# Patient Record
Sex: MALE | Race: WHITE | NOT HISPANIC OR LATINO | Employment: FULL TIME | URBAN - METROPOLITAN AREA
[De-identification: names, ages, dates, MRNs, and addresses within clinical notes are randomized per-mention and may not be internally consistent; named-entity substitution may affect disease eponyms.]

---

## 2023-07-19 ENCOUNTER — APPOINTMENT (OUTPATIENT)
Dept: GENERAL RADIOLOGY | Facility: CLINIC | Age: 18
End: 2023-07-19
Attending: EMERGENCY MEDICINE
Payer: COMMERCIAL

## 2023-07-19 ENCOUNTER — HOSPITAL ENCOUNTER (EMERGENCY)
Facility: CLINIC | Age: 18
Discharge: HOME | End: 2023-07-19
Attending: EMERGENCY MEDICINE | Admitting: EMERGENCY MEDICINE
Payer: COMMERCIAL

## 2023-07-19 VITALS
RESPIRATION RATE: 18 BRPM | OXYGEN SATURATION: 100 % | SYSTOLIC BLOOD PRESSURE: 135 MMHG | TEMPERATURE: 97.4 F | DIASTOLIC BLOOD PRESSURE: 55 MMHG | HEART RATE: 82 BPM

## 2023-07-19 DIAGNOSIS — D64.9 SYMPTOMATIC ANEMIA: ICD-10-CM

## 2023-07-19 LAB
ALBUMIN SERPL BCG-MCNC: 3.8 G/DL (ref 3.5–5.2)
ALP SERPL-CCNC: 101 U/L (ref 40–129)
ALT SERPL W P-5'-P-CCNC: 21 U/L (ref 0–70)
ANION GAP SERPL CALCULATED.3IONS-SCNC: 11 MMOL/L (ref 7–15)
AST SERPL W P-5'-P-CCNC: 28 U/L (ref 0–45)
BASOPHILS # BLD AUTO: 0 10E3/UL (ref 0–0.2)
BASOPHILS # BLD AUTO: 0 10E3/UL (ref 0–0.2)
BASOPHILS NFR BLD AUTO: 0 %
BASOPHILS NFR BLD AUTO: 0 %
BILIRUB DIRECT SERPL-MCNC: <0.2 MG/DL (ref 0–0.3)
BILIRUB SERPL-MCNC: <0.2 MG/DL
BUN SERPL-MCNC: 32.9 MG/DL (ref 6–20)
CALCIUM SERPL-MCNC: 9 MG/DL (ref 8.6–10)
CHLORIDE SERPL-SCNC: 101 MMOL/L (ref 98–107)
CREAT SERPL-MCNC: 1.15 MG/DL (ref 0.67–1.17)
DEPRECATED HCO3 PLAS-SCNC: 25 MMOL/L (ref 22–29)
EOSINOPHIL # BLD AUTO: 0.1 10E3/UL (ref 0–0.7)
EOSINOPHIL # BLD AUTO: 0.1 10E3/UL (ref 0–0.7)
EOSINOPHIL NFR BLD AUTO: 1 %
EOSINOPHIL NFR BLD AUTO: 1 %
ERYTHROCYTE [DISTWIDTH] IN BLOOD BY AUTOMATED COUNT: 12 % (ref 10–15)
ERYTHROCYTE [DISTWIDTH] IN BLOOD BY AUTOMATED COUNT: 12.1 % (ref 10–15)
FLUAV RNA SPEC QL NAA+PROBE: NEGATIVE
FLUBV RNA RESP QL NAA+PROBE: NEGATIVE
GFR SERPL CREATININE-BSD FRML MDRD: >90 ML/MIN/1.73M2
GLUCOSE SERPL-MCNC: 100 MG/DL (ref 70–99)
HCT VFR BLD AUTO: 22.1 % (ref 40–53)
HCT VFR BLD AUTO: 24 % (ref 40–53)
HGB BLD-MCNC: 7.6 G/DL (ref 13.3–17.7)
HGB BLD-MCNC: 8.3 G/DL (ref 13.3–17.7)
IMM GRANULOCYTES # BLD: 0 10E3/UL
IMM GRANULOCYTES # BLD: 0.1 10E3/UL
IMM GRANULOCYTES NFR BLD: 0 %
IMM GRANULOCYTES NFR BLD: 1 %
IRON BINDING CAPACITY (ROCHE): 298 UG/DL (ref 240–430)
IRON SATN MFR SERPL: 39 % (ref 15–46)
IRON SERPL-MCNC: 117 UG/DL (ref 61–157)
LDH SERPL L TO P-CCNC: 195 U/L (ref 0–250)
LYMPHOCYTES # BLD AUTO: 3 10E3/UL (ref 0.8–5.3)
LYMPHOCYTES # BLD AUTO: 3.3 10E3/UL (ref 0.8–5.3)
LYMPHOCYTES NFR BLD AUTO: 33 %
LYMPHOCYTES NFR BLD AUTO: 36 %
MCH RBC QN AUTO: 29.9 PG (ref 26.5–33)
MCH RBC QN AUTO: 30.1 PG (ref 26.5–33)
MCHC RBC AUTO-ENTMCNC: 34.4 G/DL (ref 31.5–36.5)
MCHC RBC AUTO-ENTMCNC: 34.6 G/DL (ref 31.5–36.5)
MCV RBC AUTO: 87 FL (ref 78–100)
MCV RBC AUTO: 87 FL (ref 78–100)
MONOCYTES # BLD AUTO: 0.5 10E3/UL (ref 0–1.3)
MONOCYTES # BLD AUTO: 0.6 10E3/UL (ref 0–1.3)
MONOCYTES NFR BLD AUTO: 6 %
MONOCYTES NFR BLD AUTO: 6 %
NEUTROPHILS # BLD AUTO: 4.6 10E3/UL (ref 1.6–8.3)
NEUTROPHILS # BLD AUTO: 5.8 10E3/UL (ref 1.6–8.3)
NEUTROPHILS NFR BLD AUTO: 56 %
NEUTROPHILS NFR BLD AUTO: 60 %
NRBC # BLD AUTO: 0 10E3/UL
NRBC # BLD AUTO: 0 10E3/UL
NRBC BLD AUTO-RTO: 0 /100
NRBC BLD AUTO-RTO: 0 /100
PLATELET # BLD AUTO: 225 10E3/UL (ref 150–450)
PLATELET # BLD AUTO: 259 10E3/UL (ref 150–450)
POTASSIUM SERPL-SCNC: 3.8 MMOL/L (ref 3.4–5.3)
PROT SERPL-MCNC: 6.3 G/DL (ref 6.4–8.3)
RBC # BLD AUTO: 2.54 10E6/UL (ref 4.4–5.9)
RBC # BLD AUTO: 2.76 10E6/UL (ref 4.4–5.9)
RETICS # AUTO: 0.05 10E6/UL (ref 0.03–0.1)
RETICS # AUTO: 0.06 10E6/UL (ref 0.03–0.1)
RETICS/RBC NFR AUTO: 1.8 % (ref 0.5–2)
RETICS/RBC NFR AUTO: 2 % (ref 0.5–2)
RSV RNA SPEC NAA+PROBE: NEGATIVE
SARS-COV-2 RNA RESP QL NAA+PROBE: NEGATIVE
SODIUM SERPL-SCNC: 137 MMOL/L (ref 136–145)
T4 FREE SERPL-MCNC: 1.26 NG/DL (ref 0.9–1.7)
TSH SERPL DL<=0.005 MIU/L-ACNC: 4.73 UIU/ML (ref 0.3–4.2)
WBC # BLD AUTO: 8.4 10E3/UL (ref 4–11)
WBC # BLD AUTO: 9.8 10E3/UL (ref 4–11)

## 2023-07-19 PROCEDURE — 83550 IRON BINDING TEST: CPT | Performed by: EMERGENCY MEDICINE

## 2023-07-19 PROCEDURE — 82728 ASSAY OF FERRITIN: CPT | Performed by: EMERGENCY MEDICINE

## 2023-07-19 PROCEDURE — 84466 ASSAY OF TRANSFERRIN: CPT | Performed by: EMERGENCY MEDICINE

## 2023-07-19 PROCEDURE — 84443 ASSAY THYROID STIM HORMONE: CPT | Performed by: EMERGENCY MEDICINE

## 2023-07-19 PROCEDURE — 36415 COLL VENOUS BLD VENIPUNCTURE: CPT | Performed by: EMERGENCY MEDICINE

## 2023-07-19 PROCEDURE — 71046 X-RAY EXAM CHEST 2 VIEWS: CPT

## 2023-07-19 PROCEDURE — 93005 ELECTROCARDIOGRAM TRACING: CPT

## 2023-07-19 PROCEDURE — 85060 BLOOD SMEAR INTERPRETATION: CPT | Performed by: PATHOLOGY

## 2023-07-19 PROCEDURE — 82248 BILIRUBIN DIRECT: CPT | Performed by: EMERGENCY MEDICINE

## 2023-07-19 PROCEDURE — 85025 COMPLETE CBC W/AUTO DIFF WBC: CPT | Performed by: EMERGENCY MEDICINE

## 2023-07-19 PROCEDURE — 84439 ASSAY OF FREE THYROXINE: CPT | Performed by: EMERGENCY MEDICINE

## 2023-07-19 PROCEDURE — 85045 AUTOMATED RETICULOCYTE COUNT: CPT | Performed by: EMERGENCY MEDICINE

## 2023-07-19 PROCEDURE — 87637 SARSCOV2&INF A&B&RSV AMP PRB: CPT | Performed by: EMERGENCY MEDICINE

## 2023-07-19 PROCEDURE — 99285 EMERGENCY DEPT VISIT HI MDM: CPT | Mod: 25

## 2023-07-19 PROCEDURE — 82746 ASSAY OF FOLIC ACID SERUM: CPT | Performed by: EMERGENCY MEDICINE

## 2023-07-19 PROCEDURE — 258N000003 HC RX IP 258 OP 636: Performed by: EMERGENCY MEDICINE

## 2023-07-19 PROCEDURE — 96360 HYDRATION IV INFUSION INIT: CPT

## 2023-07-19 PROCEDURE — 80053 COMPREHEN METABOLIC PANEL: CPT | Performed by: EMERGENCY MEDICINE

## 2023-07-19 PROCEDURE — 83615 LACTATE (LD) (LDH) ENZYME: CPT | Performed by: EMERGENCY MEDICINE

## 2023-07-19 PROCEDURE — 82607 VITAMIN B-12: CPT | Performed by: EMERGENCY MEDICINE

## 2023-07-19 RX ADMIN — SODIUM CHLORIDE 1000 ML: 9 INJECTION, SOLUTION INTRAVENOUS at 21:43

## 2023-07-19 ASSESSMENT — ACTIVITIES OF DAILY LIVING (ADL)
ADLS_ACUITY_SCORE: 35
ADLS_ACUITY_SCORE: 35

## 2023-07-19 NOTE — LETTER
July 19, 2023      To Whom It May Concern:      Brian Hooks was seen in our Emergency Department today, 07/19/23. He will require modified/light duty until cleared by his primary care physician. He should avoid working at heights or in any situation where an unexpected fall or fainting episode could cause injury.      Sincerely,        Wally Small MD

## 2023-07-20 ENCOUNTER — PATIENT OUTREACH (OUTPATIENT)
Dept: ONCOLOGY | Facility: CLINIC | Age: 18
End: 2023-07-20
Payer: COMMERCIAL

## 2023-07-20 ENCOUNTER — HOSPITAL ENCOUNTER (INPATIENT)
Facility: CLINIC | Age: 18
LOS: 1 days | Discharge: HOME | DRG: 378 | End: 2023-07-21
Attending: EMERGENCY MEDICINE | Admitting: INTERNAL MEDICINE
Payer: COMMERCIAL

## 2023-07-20 ENCOUNTER — NURSE TRIAGE (OUTPATIENT)
Dept: NURSING | Facility: CLINIC | Age: 18
End: 2023-07-20

## 2023-07-20 ENCOUNTER — TELEPHONE (OUTPATIENT)
Dept: ONCOLOGY | Facility: CLINIC | Age: 18
End: 2023-07-20

## 2023-07-20 DIAGNOSIS — F19.90 EXCESSIVE USE OF NONSTEROIDAL ANTI-INFLAMMATORY DRUGS (NSAIDS): ICD-10-CM

## 2023-07-20 DIAGNOSIS — D64.9 SYMPTOMATIC ANEMIA: ICD-10-CM

## 2023-07-20 DIAGNOSIS — K92.2 UPPER GI BLEED: ICD-10-CM

## 2023-07-20 LAB
ABO/RH(D): NORMAL
ALBUMIN SERPL BCG-MCNC: 3.9 G/DL (ref 3.5–5.2)
ALBUMIN SERPL BCG-MCNC: 4 G/DL (ref 3.5–5.2)
ALP SERPL-CCNC: 101 U/L (ref 40–129)
ALP SERPL-CCNC: 102 U/L (ref 40–129)
ALT SERPL W P-5'-P-CCNC: 23 U/L (ref 0–70)
ALT SERPL W P-5'-P-CCNC: 23 U/L (ref 0–70)
ANION GAP SERPL CALCULATED.3IONS-SCNC: 9 MMOL/L (ref 7–15)
ANTIBODY SCREEN: NEGATIVE
AST SERPL W P-5'-P-CCNC: 27 U/L (ref 0–45)
AST SERPL W P-5'-P-CCNC: 28 U/L (ref 0–45)
ATRIAL RATE - MUSE: 86 BPM
BASOPHILS # BLD AUTO: 0 10E3/UL (ref 0–0.2)
BASOPHILS NFR BLD AUTO: 0 %
BILIRUB DIRECT SERPL-MCNC: <0.2 MG/DL (ref 0–0.3)
BILIRUB SERPL-MCNC: 0.2 MG/DL
BILIRUB SERPL-MCNC: 0.2 MG/DL
BLD PROD TYP BPU: NORMAL
BLOOD COMPONENT TYPE: NORMAL
BUN SERPL-MCNC: 21 MG/DL (ref 6–20)
CALCIUM SERPL-MCNC: 9 MG/DL (ref 8.6–10)
CHLORIDE SERPL-SCNC: 103 MMOL/L (ref 98–107)
CODING SYSTEM: NORMAL
CREAT SERPL-MCNC: 1.14 MG/DL (ref 0.67–1.17)
CROSSMATCH: NORMAL
DEPRECATED HCO3 PLAS-SCNC: 26 MMOL/L (ref 22–29)
DIASTOLIC BLOOD PRESSURE - MUSE: NORMAL MMHG
EOSINOPHIL # BLD AUTO: 0.2 10E3/UL (ref 0–0.7)
EOSINOPHIL NFR BLD AUTO: 2 %
ERYTHROCYTE [DISTWIDTH] IN BLOOD BY AUTOMATED COUNT: 12.3 % (ref 10–15)
FERRITIN SERPL-MCNC: 78 NG/ML (ref 31–409)
FOLATE SERPL-MCNC: 13.6 NG/ML (ref 4.6–34.8)
GFR SERPL CREATININE-BSD FRML MDRD: >90 ML/MIN/1.73M2
GLUCOSE SERPL-MCNC: 103 MG/DL (ref 70–99)
HCT VFR BLD AUTO: 24.6 % (ref 40–53)
HGB BLD-MCNC: 8.3 G/DL (ref 13.3–17.7)
HOLD SPECIMEN: NORMAL
IMM GRANULOCYTES # BLD: 0.1 10E3/UL
IMM GRANULOCYTES NFR BLD: 1 %
INR PPP: 1 (ref 0.85–1.15)
INTERPRETATION ECG - MUSE: NORMAL
ISSUE DATE AND TIME: NORMAL
LYMPHOCYTES # BLD AUTO: 2.3 10E3/UL (ref 0.8–5.3)
LYMPHOCYTES NFR BLD AUTO: 35 %
MCH RBC QN AUTO: 29.6 PG (ref 26.5–33)
MCHC RBC AUTO-ENTMCNC: 33.7 G/DL (ref 31.5–36.5)
MCV RBC AUTO: 88 FL (ref 78–100)
MONOCYTES # BLD AUTO: 0.4 10E3/UL (ref 0–1.3)
MONOCYTES NFR BLD AUTO: 7 %
NEUTROPHILS # BLD AUTO: 3.6 10E3/UL (ref 1.6–8.3)
NEUTROPHILS NFR BLD AUTO: 55 %
NRBC # BLD AUTO: 0 10E3/UL
NRBC BLD AUTO-RTO: 0 /100
P AXIS - MUSE: 65 DEGREES
PATH REPORT.COMMENTS IMP SPEC: NORMAL
PATH REPORT.FINAL DX SPEC: NORMAL
PATH REPORT.MICROSCOPIC SPEC OTHER STN: NORMAL
PATH REPORT.MICROSCOPIC SPEC OTHER STN: NORMAL
PATH REPORT.RELEVANT HX SPEC: NORMAL
PLATELET # BLD AUTO: 267 10E3/UL (ref 150–450)
POTASSIUM SERPL-SCNC: 4.1 MMOL/L (ref 3.4–5.3)
PR INTERVAL - MUSE: 170 MS
PROT SERPL-MCNC: 6.2 G/DL (ref 6.4–8.3)
PROT SERPL-MCNC: 6.3 G/DL (ref 6.4–8.3)
QRS DURATION - MUSE: 94 MS
QT - MUSE: 370 MS
QTC - MUSE: 442 MS
R AXIS - MUSE: 80 DEGREES
RBC # BLD AUTO: 2.8 10E6/UL (ref 4.4–5.9)
SODIUM SERPL-SCNC: 138 MMOL/L (ref 136–145)
SPECIMEN EXPIRATION DATE: NORMAL
SYSTOLIC BLOOD PRESSURE - MUSE: NORMAL MMHG
T AXIS - MUSE: 36 DEGREES
TRANSFERRIN SERPL-MCNC: 233 MG/DL (ref 200–360)
UNIT ABO/RH: NORMAL
UNIT NUMBER: NORMAL
UNIT STATUS: NORMAL
UNIT TYPE ISBT: 5100
VENTRICULAR RATE- MUSE: 86 BPM
VIT B12 SERPL-MCNC: 453 PG/ML (ref 232–1245)
WBC # BLD AUTO: 6.5 10E3/UL (ref 4–11)

## 2023-07-20 PROCEDURE — 99223 1ST HOSP IP/OBS HIGH 75: CPT | Performed by: INTERNAL MEDICINE

## 2023-07-20 PROCEDURE — 120N000001 HC R&B MED SURG/OB

## 2023-07-20 PROCEDURE — 82248 BILIRUBIN DIRECT: CPT | Performed by: EMERGENCY MEDICINE

## 2023-07-20 PROCEDURE — 36415 COLL VENOUS BLD VENIPUNCTURE: CPT | Performed by: EMERGENCY MEDICINE

## 2023-07-20 PROCEDURE — 99285 EMERGENCY DEPT VISIT HI MDM: CPT | Mod: 25

## 2023-07-20 PROCEDURE — 86850 RBC ANTIBODY SCREEN: CPT | Performed by: EMERGENCY MEDICINE

## 2023-07-20 PROCEDURE — 85610 PROTHROMBIN TIME: CPT | Performed by: EMERGENCY MEDICINE

## 2023-07-20 PROCEDURE — C9113 INJ PANTOPRAZOLE SODIUM, VIA: HCPCS | Mod: JZ | Performed by: EMERGENCY MEDICINE

## 2023-07-20 PROCEDURE — 96374 THER/PROPH/DIAG INJ IV PUSH: CPT

## 2023-07-20 PROCEDURE — P9016 RBC LEUKOCYTES REDUCED: HCPCS | Performed by: EMERGENCY MEDICINE

## 2023-07-20 PROCEDURE — 85014 HEMATOCRIT: CPT | Performed by: EMERGENCY MEDICINE

## 2023-07-20 PROCEDURE — 250N000011 HC RX IP 250 OP 636: Mod: JZ | Performed by: INTERNAL MEDICINE

## 2023-07-20 PROCEDURE — 86901 BLOOD TYPING SEROLOGIC RH(D): CPT | Performed by: EMERGENCY MEDICINE

## 2023-07-20 PROCEDURE — 93005 ELECTROCARDIOGRAM TRACING: CPT

## 2023-07-20 PROCEDURE — 86923 COMPATIBILITY TEST ELECTRIC: CPT | Performed by: EMERGENCY MEDICINE

## 2023-07-20 PROCEDURE — 250N000011 HC RX IP 250 OP 636: Mod: JZ | Performed by: EMERGENCY MEDICINE

## 2023-07-20 PROCEDURE — C9113 INJ PANTOPRAZOLE SODIUM, VIA: HCPCS | Mod: JZ | Performed by: INTERNAL MEDICINE

## 2023-07-20 RX ADMIN — PANTOPRAZOLE SODIUM 40 MG: 40 INJECTION, POWDER, FOR SOLUTION INTRAVENOUS at 19:56

## 2023-07-20 RX ADMIN — PANTOPRAZOLE SODIUM 40 MG: 40 INJECTION, POWDER, FOR SOLUTION INTRAVENOUS at 22:51

## 2023-07-20 ASSESSMENT — ACTIVITIES OF DAILY LIVING (ADL)
ADLS_ACUITY_SCORE: 33
ADLS_ACUITY_SCORE: 35
ADLS_ACUITY_SCORE: 35
ADLS_ACUITY_SCORE: 33

## 2023-07-20 NOTE — TELEPHONE ENCOUNTER
Caller reporting the following red-flag symptom(s): Blood in stoop    Per the system red-flag symptom policy, patient was instructed to:  speak with a Registered Nurse    Action:  Patient warm transferred to a Registered Nurse     Was able to connect Pt's mom Neha with Sara red flag RN.

## 2023-07-20 NOTE — PROGRESS NOTES
Referral received for benign heme services, see below.    Referral reason: Anemia    Current abnormal labs: Available in Chart Review    Outreach: Generic voicemail left regarding referral.    Plan: Triage instructions updated and sent to NPS for completion, VM left for patient, [facility] not available to send message.

## 2023-07-20 NOTE — ED PROVIDER NOTES
History     Chief Complaint:  Dizziness and Shortness of Breath       The history is provided by the patient.      Brian Hooks is a 18 year old male who presents to the ED for evaluation of fatigue and dizziness since yesterday. 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 He endorses heavy breathing and feeling winded easy as well. Patient denies recent syncope. He has no shortness of breath at rest. Of note, the patient feels that it takes a lot of energy to walk recently. He adds that a few weeks ago he was doing his normal workout routine when he suddenly had to stop because he couldn't breathe. He has not had any issues with his workouts since this time. The patient also reports a constant headache recently. He notes having a cold involving a sore throat, runny nose, and loss of appetite over the last week as well. These symptoms have now improved. The patient denies recent fevers, chest pain, chest tightness, abdominal pain, vomiting, diarrhea, urinary symptoms, current rashes, black/bloody stools, and other sources of bleeding. He also denies muscle pain and joint issues. The patient does not take any prescription medications but uses Creatine, ashwagandha, and sea manning. The patient does not smoke or drink alcohol regularly. No family history of blood disorders.      Independent Historian:   None - Patient Only    Review of External Notes:  none    ROS:  See HPI    Allergies:  No Known Allergies     Physical Exam     Patient Vitals for the past 24 hrs:   BP Temp Temp src Pulse Resp SpO2   07/19/23 2322 135/55 -- -- 82 18 100 %   07/19/23 2145 124/65 -- -- -- -- 100 %   07/19/23 2016 (!) 158/78 97.4  F (36.3  C) Temporal 90 18 100 %        Physical Exam  General: Well appearing, nontoxic.  Resting comfortably  Head:  Scalp, face, and head appear normal  Eyes:  Pupils are equal,  round    Conjunctivae non-injected and sclerae white  ENT:    The external nose is normal    Pinnae are normal  Neck:  Normal range of motion    There is no rigidity noted    Trachea is in the midline  CV:  Regular rate and rhythm     Normal S1/S2, no S3/S4    No murmur or rub. Radial pulses 2+ bilaterally.  Resp:  Lungs are clear and equal bilaterally  There is no tachypnea    No increased work of breathing    No rales, wheezing, or rhonchi  GI:  Abdomen is soft, no rigidity or guarding    No distension, or mass    No tenderness or rebound tenderness   MS:  Normal muscular tone    Symmetric motor strength    No lower extremity edema  Skin:  No rash or acute skin lesions noted  Neuro: Awake and alert  Speech is normal and fluent  Moves all extremities spontaneously  Psych:  Normal affect. Appropriate interactions.      Emergency Department Course   ECG:  ECG results from 07/19/23   EKG 12-lead, tracing only     Value    Systolic Blood Pressure     Diastolic Blood Pressure     Ventricular Rate 86    Atrial Rate 86    UT Interval 170    QRS Duration 94        QTc 442    P Axis 65    R AXIS 80    T Axis 36    Interpretation ECG      Sinus rhythm with sinus arrhythmia  Normal ECG  No previous ECGs available         Imaging:  XR Chest 2 Views   Final Result   IMPRESSION: Heart is normal in size. Lungs are clear.         Report per radiology    Laboratory:  Labs Ordered and Resulted from Time of ED Arrival to Time of ED Departure   BASIC METABOLIC PANEL - Abnormal       Result Value    Sodium 137      Potassium 3.8      Chloride 101      Carbon Dioxide (CO2) 25      Anion Gap 11      Urea Nitrogen 32.9 (*)     Creatinine 1.15      Calcium 9.0      Glucose 100 (*)     GFR Estimate >90     CBC WITH PLATELETS AND DIFFERENTIAL - Abnormal    WBC Count 9.8      RBC Count 2.76 (*)     Hemoglobin 8.3 (*)     Hematocrit 24.0 (*)     MCV 87      MCH 30.1      MCHC 34.6      RDW 12.1      Platelet Count 259      % Neutrophils  60      % Lymphocytes 33      % Monocytes 6      % Eosinophils 1      % Basophils 0      % Immature Granulocytes 0      NRBCs per 100 WBC 0      Absolute Neutrophils 5.8      Absolute Lymphocytes 3.3      Absolute Monocytes 0.6      Absolute Eosinophils 0.1      Absolute Basophils 0.0      Absolute Immature Granulocytes 0.0      Absolute NRBCs 0.0     HEPATIC FUNCTION PANEL - Abnormal    Protein Total 6.3 (*)     Albumin 3.8      Bilirubin Total <0.2      Alkaline Phosphatase 101      AST 28      ALT 21      Bilirubin Direct <0.20     TSH WITH FREE T4 REFLEX - Abnormal    TSH 4.73 (*)    CBC WITH PLATELETS AND DIFFERENTIAL - Abnormal    WBC Count 8.4      RBC Count 2.54 (*)     Hemoglobin 7.6 (*)     Hematocrit 22.1 (*)     MCV 87      MCH 29.9      MCHC 34.4      RDW 12.0      Platelet Count 225      % Neutrophils 56      % Lymphocytes 36      % Monocytes 6      % Eosinophils 1      % Basophils 0      % Immature Granulocytes 1      NRBCs per 100 WBC 0      Absolute Neutrophils 4.6      Absolute Lymphocytes 3.0      Absolute Monocytes 0.5      Absolute Eosinophils 0.1      Absolute Basophils 0.0      Absolute Immature Granulocytes 0.1      Absolute NRBCs 0.0     INFLUENZA A/B, RSV, & SARS-COV2 PCR - Normal    Influenza A PCR Negative      Influenza B PCR Negative      RSV PCR Negative      SARS CoV2 PCR Negative     LACTATE DEHYDROGENASE - Normal    Lactate Dehydrogenase 195     RETICULOCYTE COUNT - Normal    % Reticulocyte 2.0      Absolute Reticulocyte 0.056     IRON AND IRON BINDING CAPACITY - Normal    Iron 117      Iron Binding Capacity 298      Iron Sat Index 39     RETICULOCYTE COUNT - Normal    % Reticulocyte 1.8      Absolute Reticulocyte 0.046     T4 FREE - Normal    Free T4 1.26     MORPHOLOGY TRACKING   FERRITIN   VITAMIN B12   FOLATE   TRANSFERRIN   BLOOD MORPHOLOGY PATHOLOGIST REVIEW   LAB BLOOD MORPHOLOGY PATHOLOGIST REVIEW        Procedures     Emergency Department Course &  Assessments:      Interventions:  Medications   0.9% sodium chloride BOLUS (0 mLs Intravenous Stopped 7/19/23 2243)        Assessments, Independent Interpretation, Consult/Discussion of Management and Tests:  ED Course as of 07/20/23 0036   Wed Jul 19, 2023 2135 I performed an independent interpretation of the patient's chest radiograph.  No pneumothorax or large pleural effusions.   2152 I obtained history and examined the patient as noted above.     Social Determinants of Health affecting care:  None    Disposition:  The patient was discharged to home.     Impression & Plan      Medical Decision Making:  Brian Hooks is a 18 year old male who presents to the ED for evaluation of fatigue, weakness, dizziness. On my evaluation the patient is well appearing, hemodynamically stable and afebrile. No focal neurologic deficits. ECG without ischemia or dysrhythmia. CXR neg. COVID/Influenza/RSV testing negative. Exam is otherwise benign. Lab studies reveal normocytic anemia of unclear etiology. CBC was repeated to confirm. Patient denies any BRBPR, melena, or any other bleeding. He has no abdominal pain. He is not anticoagulated or on antiplatelets. Lab studies not consistent with hemolysis. Work up for anemia was started with iron studies, B12, folate, retic count, peripheral smear. Patient is otherwise stable. Hgb > 7 therefore no emergent blood transfusion is indicated at this time. He will need very close outpatient follow up with PCP and hematology for follow up of the anemia labs and further testing and evaluation. Patient to return immediately for any worsening, or any bleeding. Patient and patient's mother agreeable with the plan of care. I discussed with him that he should not work at any heights or in situations where getting dizzy or fainting could cause him injury until his evaluation is completed and he is feeling improved. Return precautions were discussed with patient. The patient's questions were  answered and the patient was agreeable with discharge.       Diagnosis:    ICD-10-CM    1. Symptomatic anemia  D64.9 Primary Care Referral     Adult Oncology/Hematology  Referral           Scribe Disclosure:  I, Fanny Julia, am serving as a scribe at 8:55 PM on 7/19/2023 to document services personally performed by Wally Small MD based on my observations and the provider's statements to me.     Scribe Disclosure:  I, Nirmala Leticia, am serving as a scribe at 10:45 PM on 7/19/2023 to document services personally performed by Wally Small MD based on my observations and the provider's statements to me.     7/19/2023   Wally Small MD     Historical Data:  ______________________________________________________________________  Medications:    No current outpatient medications on file.      Past Medical History:   Past Surgical History:     No past medical history on file. No past surgical history on file.   There are no problems to display for this patient.         Family History:    family history is not on file. Social History:        PCP: No primary care provider on file.            Wally Small MD  07/20/23 0129

## 2023-07-20 NOTE — ED TRIAGE NOTES
Pt was here yesterday for lightheadedness x2 days and SOB. Hgb found to be low at 8.3 and 7.6 on recheck but pt denied any bloody stools at that time and was discharged home. Today pt had dark bloody stool x2 with continued lightheadedness and SOB. ABCs intact.       
Father  Still living? Unknown  Family history of coronary artery disease, Age at diagnosis: Age Unknown     Sibling  Still living? No  Family history of coronary artery disease, Age at diagnosis: Age Unknown

## 2023-07-20 NOTE — ED NOTES
Rapid Assessment Note    History:   Brian Hooks is a 18 year old male who presents with 2 days lightheadedness, dizziness, and shortness of breath. He was evaluated for this on the day of onset, and when asked about blood in stool, he denied this, but recalls that he never attention to his stool. Since coming home, he has now noticed dark bloody stools yesterday evening. He regularly takes ibuprofen and Aspirin due to regular headaches. He currently denies abdominal pain.    Exam:   General:  Alert, interactive  Cardiovascular:  Well perfused  Lungs:  No respiratory distress, no accessory muscle use  Neuro:  Moving all 4 extremities  Skin:  Warm, dry  Psych:  Normal affect    Plan of Care:   I evaluated the patient and developed an initial plan of care. I discussed this plan and explained that I, or one of my partners, would be returning to complete the evaluation.       Needs room for complete exam. No pain so no CT ordered.  Explained likely cause too many NSAIDS.   Stable bi bleeds can often be management with PPI, decrease NSAIDS, Follow-up with GI.     Scribe Disclosure:  I, Pardeep Ferrer, am serving as a scribe at 4:56 PM on 7/20/2023 to document services personally performed by Flex Casiano MD based on my observations and the provider's statements to me.     7/20/2023  [employer]    Portions of this medical record were completed by a scribe. UPON MY REVIEW AND AUTHENTICATION BY ELECTRONIC SIGNATURE, this confirms (a) I performed the applicable clinical services, and (b) the record is accurate.      Flex Casiano MD  07/20/23 8506

## 2023-07-20 NOTE — ED TRIAGE NOTES
Presents to triage with c/o dizziness and SOB that began yesterday. patient stated that when he changes positions he feels as though he might pass out. Had cold symptoms for about a week and a half prior to the start of these symptoms.

## 2023-07-20 NOTE — DISCHARGE INSTRUCTIONS
Your blood test (hemoglobin and blood counts) should be rechecked in 3-5 days to make sure they are not getting lower. Return to the ED immediately for any worsening of your condition.

## 2023-07-20 NOTE — ED NOTES
Rapid Assessment Note    History:   Brian Hooks is a 18 year old male who presents with fatigue and dizziness. Last 2 days, patient reports feeling increasingly dizzy when he stands up or changes position. He reports that when he kneels down at work and stands up, feels that he is going to pass out. No syncope. Endorses heavy breathing and is feeling winded easy. No shortness of breath at rest. Takes a lot of energy to walk. Really fatigued and tired. Endorses starting of a cold such as sore throat and runny nose over the last week. He notes that his mucous is green. Endorses Loss of appetite. Denies fever and chest pain. Denies vomiting, diarreha, urianry symptoms. Had a rash left arm last week but cleared up. Does not take any medication    Exam:   General:  Alert, interactive  Cardiovascular:  Well perfused  Lungs:  No respiratory distress, no accessory muscle use  Neuro:  Moving all 4 extremities  Skin:  Warm, dry  Psych:  Normal affect      Plan of Care:   I evaluated the patient and developed an initial plan of care. I discussed this plan and explained that I, or one of my partners, would be returning to complete the evaluation.     Labs, COVID, CXR      I, Fanny Dumont, am serving as a scribe to document services personally performed by Wally Small MD, based on my observations and the provider's statements to me.    7/19/2023  [employer]    Portions of this medical record were completed by a scribe. UPON MY REVIEW AND AUTHENTICATION BY ELECTRONIC SIGNATURE, this confirms (a) I performed the applicable clinical services, and (b) the record is accurate.        Wally Small MD  07/19/23 2032

## 2023-07-20 NOTE — TELEPHONE ENCOUNTER
Nurse Triage SBAR    Is this a 2nd Level Triage? YES, LICENSED PRACTITIONER REVIEW IS REQUIRED    Situation: Pt went to ED yesterday, low hgb  8.3.  See visit notes.  Stated when asked about bloody stools, he responded none, because he did not pay attention to his stools.    Now reporting that last night and today with formed stool, burgundy in color, no clots passed.   He does take Ibuprofen 250 mg  and ASA , 325mg for headaches almost daily.  Patient for 2 days prior was pale, lightheaded, dizzy when at work or getting up.  He installs solar panels.  Up and down,   That is what made him present to the ED.  Patient states that he is not so pale today, that is collaborated by his Mother as well, she is included in on the call.  Verbal consent to talk with Mother given by patient.  They are asked to make sure that they sign forms at the visit for FAY consent and all that.  Voiding ok  No change in diet.  No ingestion of food red in color.  No injury or fall.  Patient denies SOB or chest discomfort.  Abdomen is not tender or bloated or distended.    Background: see above, see ED notes      Protocol Recommended Disposition:   RN routing message to clinic    Recommendation:     Discussed with patient and his Mother.  When to return to ED.   Pain, bloating, increased blood, increased fatique, SOB or chest discomfort.  They verbalized understanding and agreement with plan.  RN to route to clinic for next steps.      Routed to provider        Reason for Disposition    Patient wants to be seen    Additional Information    Negative: Passed out (i.e., fainted, collapsed and was not responding)    Negative: Shock suspected (e.g., cold/pale/clammy skin, too weak to stand, low BP, rapid pulse)    Negative: Vomiting red blood or black (coffee ground) material    Negative: Sounds like a life-threatening emergency to the triager    Negative: SEVERE rectal bleeding (large blood clots; constant or on and off bleeding)    Negative:  "SEVERE dizziness (e.g., unable to stand, requires support to walk, feels like passing out now)    Negative: MODERATE rectal bleeding (small blood clots, passing blood without stool, or toilet water turns red) more than once a day    Negative: Bloody, black, or tarry bowel movements  (Exception: Chronic-unchanged black-grey bowel movements and is taking iron pills or Pepto-Bismol.)    Negative: High-risk adult (e.g., prior surgery on aorta, abdominal aortic aneurysm)    Negative: Rectal foreign body (inserted or swallowed)    Negative: SEVERE abdominal pain (e.g., excruciating)    Negative: Constant abdominal pain lasting > 2 hours    Negative: Pale skin (pallor) of new-onset or worsening    Negative: Patient sounds very sick or weak to the triager    Negative: MILD rectal bleeding (more than just a few drops or streaks)    Negative: Cancer of rectum or intestines (colon)    Negative: Radiation therapy to lower abdomen or pelvis    Negative: Known cirrhosis of the liver (or history of liver failure or ascites)    Negative: Colonoscopy in past 72 hours    Negative: Taking Coumadin (warfarin) or other strong blood thinner, or known bleeding disorder (e.g., thrombocytopenia)    Negative: Rectal bleeding is minimal (e.g., blood just on toilet paper, a few drops in toilet bowl)    Answer Assessment - Initial Assessment Questions  1. APPEARANCE of BLOOD: \"What color is it?\" \"Is it passed separately, on the surface of the stool, or mixed in with the stool?\"       Blood in bowel movement, burgandy in color, stool formed,, no clots noted  2. AMOUNT: \"How much blood was passed?\"       Stool is dark rachel danay, near stool the bowel was reddish.  No clots seen on tissue paper  3. FREQUENCY: \"How many times has blood been passed with the stools?\"       2 , he was not paying attention to it before  4. ONSET: \"When was the blood first seen in the stools?\" (Days or weeks)      Last night and today  5. DIARRHEA: \"Is there also some " "diarrhea?\" If Yes, ask: \"How many diarrhea stools in the past 24 hours?\"       no  6. CONSTIPATION: \"Do you have constipation?\" If Yes, ask: \"How bad is it?\"      Not constipated  7. RECURRENT SYMPTOMS: \"Have you had blood in your stools before?\" If Yes, ask: \"When was the last time?\" and \"What happened that time?\"       no  8. BLOOD THINNERS: \"Do you take any blood thinners?\" (e.g., Coumadin/warfarin, Pradaxa/dabigatran, aspirin)      No      But does take Ibuprofen,  Headache daily.  Ibuprofen, 200mg   taken daily is estimated at 2-3 a day and alternating Aspirin  325mg    9. OTHER SYMPTOMS: \"Do you have any other symptoms?\"  (e.g., abdomen pain, vomiting, dizziness, fever)      No abdominal pain, no vomiting and no bloating,  Yes, dizziness especially at work, up and down.  Install solar panels.  No fever or chills  10. PREGNANCY: \"Is there any chance you are pregnant?\" \"When was your last menstrual period?\"n/a    Protocols used: RECTAL BLEEDING-A-[place]      "

## 2023-07-21 VITALS
SYSTOLIC BLOOD PRESSURE: 114 MMHG | BODY MASS INDEX: 26.04 KG/M2 | WEIGHT: 186 LBS | RESPIRATION RATE: 16 BRPM | HEIGHT: 71 IN | TEMPERATURE: 97.5 F | DIASTOLIC BLOOD PRESSURE: 65 MMHG | HEART RATE: 61 BPM | OXYGEN SATURATION: 98 %

## 2023-07-21 LAB
ALBUMIN SERPL BCG-MCNC: 3.5 G/DL (ref 3.5–5.2)
ALP SERPL-CCNC: 100 U/L (ref 40–129)
ALT SERPL W P-5'-P-CCNC: 22 U/L (ref 0–70)
ANION GAP SERPL CALCULATED.3IONS-SCNC: 10 MMOL/L (ref 7–15)
AST SERPL W P-5'-P-CCNC: 24 U/L (ref 0–45)
ATRIAL RATE - MUSE: 73 BPM
BILIRUB DIRECT SERPL-MCNC: <0.2 MG/DL (ref 0–0.3)
BILIRUB SERPL-MCNC: 0.2 MG/DL
BUN SERPL-MCNC: 16.4 MG/DL (ref 6–20)
CALCIUM SERPL-MCNC: 8.8 MG/DL (ref 8.6–10)
CHLORIDE SERPL-SCNC: 105 MMOL/L (ref 98–107)
CREAT SERPL-MCNC: 1.17 MG/DL (ref 0.67–1.17)
DEPRECATED HCO3 PLAS-SCNC: 26 MMOL/L (ref 22–29)
DIASTOLIC BLOOD PRESSURE - MUSE: NORMAL MMHG
ERYTHROCYTE [DISTWIDTH] IN BLOOD BY AUTOMATED COUNT: 12.6 % (ref 10–15)
GFR SERPL CREATININE-BSD FRML MDRD: >90 ML/MIN/1.73M2
GLUCOSE SERPL-MCNC: 111 MG/DL (ref 70–99)
HCT VFR BLD AUTO: 25.7 % (ref 40–53)
HGB BLD-MCNC: 8.7 G/DL (ref 13.3–17.7)
HGB BLD-MCNC: 8.8 G/DL (ref 13.3–17.7)
HGB BLD-MCNC: 9.2 G/DL (ref 13.3–17.7)
INTERPRETATION ECG - MUSE: NORMAL
MCH RBC QN AUTO: 30 PG (ref 26.5–33)
MCHC RBC AUTO-ENTMCNC: 33.9 G/DL (ref 31.5–36.5)
MCV RBC AUTO: 89 FL (ref 78–100)
P AXIS - MUSE: 64 DEGREES
PLATELET # BLD AUTO: 223 10E3/UL (ref 150–450)
POTASSIUM SERPL-SCNC: 4.4 MMOL/L (ref 3.4–5.3)
PR INTERVAL - MUSE: 168 MS
PROT SERPL-MCNC: 5.7 G/DL (ref 6.4–8.3)
QRS DURATION - MUSE: 96 MS
QT - MUSE: 396 MS
QTC - MUSE: 436 MS
R AXIS - MUSE: 80 DEGREES
RBC # BLD AUTO: 2.9 10E6/UL (ref 4.4–5.9)
SODIUM SERPL-SCNC: 141 MMOL/L (ref 136–145)
SYSTOLIC BLOOD PRESSURE - MUSE: NORMAL MMHG
T AXIS - MUSE: 37 DEGREES
UPPER GI ENDOSCOPY: NORMAL
VENTRICULAR RATE- MUSE: 73 BPM
WBC # BLD AUTO: 5.5 10E3/UL (ref 4–11)

## 2023-07-21 PROCEDURE — 99238 HOSP IP/OBS DSCHRG MGMT 30/<: CPT | Performed by: STUDENT IN AN ORGANIZED HEALTH CARE EDUCATION/TRAINING PROGRAM

## 2023-07-21 PROCEDURE — 250N000011 HC RX IP 250 OP 636: Mod: JZ | Performed by: INTERNAL MEDICINE

## 2023-07-21 PROCEDURE — 36415 COLL VENOUS BLD VENIPUNCTURE: CPT | Performed by: INTERNAL MEDICINE

## 2023-07-21 PROCEDURE — 85027 COMPLETE CBC AUTOMATED: CPT | Performed by: INTERNAL MEDICINE

## 2023-07-21 PROCEDURE — C9113 INJ PANTOPRAZOLE SODIUM, VIA: HCPCS | Mod: JZ | Performed by: INTERNAL MEDICINE

## 2023-07-21 PROCEDURE — 80053 COMPREHEN METABOLIC PANEL: CPT | Performed by: INTERNAL MEDICINE

## 2023-07-21 PROCEDURE — 43235 EGD DIAGNOSTIC BRUSH WASH: CPT | Performed by: INTERNAL MEDICINE

## 2023-07-21 PROCEDURE — 0DJ08ZZ INSPECTION OF UPPER INTESTINAL TRACT, VIA NATURAL OR ARTIFICIAL OPENING ENDOSCOPIC: ICD-10-PCS | Performed by: INTERNAL MEDICINE

## 2023-07-21 PROCEDURE — 999N000099 HC STATISTIC MODERATE SEDATION < 10 MIN: Performed by: INTERNAL MEDICINE

## 2023-07-21 PROCEDURE — 250N000009 HC RX 250: Performed by: INTERNAL MEDICINE

## 2023-07-21 PROCEDURE — 85018 HEMOGLOBIN: CPT | Performed by: INTERNAL MEDICINE

## 2023-07-21 PROCEDURE — 82248 BILIRUBIN DIRECT: CPT | Performed by: INTERNAL MEDICINE

## 2023-07-21 PROCEDURE — 250N000011 HC RX IP 250 OP 636: Performed by: INTERNAL MEDICINE

## 2023-07-21 RX ORDER — FLUMAZENIL 0.1 MG/ML
0.2 INJECTION, SOLUTION INTRAVENOUS
Status: DISCONTINUED | OUTPATIENT
Start: 2023-07-21 | End: 2023-07-21 | Stop reason: HOSPADM

## 2023-07-21 RX ORDER — NALOXONE HYDROCHLORIDE 0.4 MG/ML
0.2 INJECTION, SOLUTION INTRAMUSCULAR; INTRAVENOUS; SUBCUTANEOUS
Status: DISCONTINUED | OUTPATIENT
Start: 2023-07-21 | End: 2023-07-21 | Stop reason: HOSPADM

## 2023-07-21 RX ORDER — MULTIVIT WITH MINERALS/LUTEIN
250 TABLET ORAL DAILY
Qty: 30 TABLET | Refills: 0 | Status: SHIPPED | OUTPATIENT
Start: 2023-07-21

## 2023-07-21 RX ORDER — NALOXONE HYDROCHLORIDE 0.4 MG/ML
0.4 INJECTION, SOLUTION INTRAMUSCULAR; INTRAVENOUS; SUBCUTANEOUS
Status: DISCONTINUED | OUTPATIENT
Start: 2023-07-21 | End: 2023-07-21 | Stop reason: HOSPADM

## 2023-07-21 RX ORDER — EPINEPHRINE 1 MG/ML
0.1 INJECTION, SOLUTION INTRAMUSCULAR; SUBCUTANEOUS
Status: DISCONTINUED | OUTPATIENT
Start: 2023-07-21 | End: 2023-07-21 | Stop reason: HOSPADM

## 2023-07-21 RX ORDER — FENTANYL CITRATE 50 UG/ML
50-100 INJECTION, SOLUTION INTRAMUSCULAR; INTRAVENOUS EVERY 5 MIN PRN
Status: DISCONTINUED | OUTPATIENT
Start: 2023-07-21 | End: 2023-07-21 | Stop reason: HOSPADM

## 2023-07-21 RX ORDER — SIMETHICONE 40MG/0.6ML
133 SUSPENSION, DROPS(FINAL DOSAGE FORM)(ML) ORAL
Status: DISCONTINUED | OUTPATIENT
Start: 2023-07-21 | End: 2023-07-21 | Stop reason: HOSPADM

## 2023-07-21 RX ORDER — IBUPROFEN 600 MG/1
600 TABLET, FILM COATED ORAL DAILY
Status: ON HOLD | COMMUNITY
End: 2023-07-21

## 2023-07-21 RX ORDER — DIPHENHYDRAMINE HYDROCHLORIDE 50 MG/ML
25-50 INJECTION INTRAMUSCULAR; INTRAVENOUS
Status: DISCONTINUED | OUTPATIENT
Start: 2023-07-21 | End: 2023-07-21 | Stop reason: HOSPADM

## 2023-07-21 RX ORDER — DOCUSATE SODIUM 100 MG/1
100 CAPSULE, LIQUID FILLED ORAL 2 TIMES DAILY
Qty: 100 CAPSULE | Refills: 0 | Status: SHIPPED | OUTPATIENT
Start: 2023-07-21

## 2023-07-21 RX ORDER — ATROPINE SULFATE 0.1 MG/ML
1 INJECTION INTRAVENOUS
Status: DISCONTINUED | OUTPATIENT
Start: 2023-07-21 | End: 2023-07-21 | Stop reason: HOSPADM

## 2023-07-21 RX ORDER — FERROUS SULFATE 325(65) MG
325 TABLET ORAL
Qty: 30 TABLET | Refills: 0 | Status: SHIPPED | OUTPATIENT
Start: 2023-07-21

## 2023-07-21 RX ORDER — PANTOPRAZOLE SODIUM 40 MG/1
40 TABLET, DELAYED RELEASE ORAL DAILY
Qty: 7 TABLET | Refills: 0 | Status: SHIPPED | OUTPATIENT
Start: 2023-07-21

## 2023-07-21 RX ORDER — LIDOCAINE 40 MG/G
CREAM TOPICAL
Status: DISCONTINUED | OUTPATIENT
Start: 2023-07-21 | End: 2023-07-21 | Stop reason: HOSPADM

## 2023-07-21 RX ADMIN — MIDAZOLAM 3 MG: 1 INJECTION INTRAMUSCULAR; INTRAVENOUS at 10:22

## 2023-07-21 RX ADMIN — PANTOPRAZOLE SODIUM 40 MG: 40 INJECTION, POWDER, FOR SOLUTION INTRAVENOUS at 01:00

## 2023-07-21 RX ADMIN — PANTOPRAZOLE SODIUM 40 MG: 40 INJECTION, POWDER, FOR SOLUTION INTRAVENOUS at 12:17

## 2023-07-21 RX ADMIN — FENTANYL CITRATE 100 MCG: 50 INJECTION, SOLUTION INTRAMUSCULAR; INTRAVENOUS at 10:22

## 2023-07-21 RX ADMIN — TOPICAL ANESTHETIC 0.5 ML: 200 SPRAY DENTAL; PERIODONTAL at 10:22

## 2023-07-21 RX ADMIN — MIDAZOLAM 1 MG: 1 INJECTION INTRAMUSCULAR; INTRAVENOUS at 10:26

## 2023-07-21 ASSESSMENT — ACTIVITIES OF DAILY LIVING (ADL)
ADLS_ACUITY_SCORE: 22

## 2023-07-21 NOTE — UTILIZATION REVIEW
"Inpatient to Observation note:    Admission Status; Secondary Review Determination         Under the authority of the Utilization Management Committee, the utilization review process indicated a secondary review on the above patient.  The review outcome is based on review of the medical records, discussions with staff, and applying clinical experience noted on the date of the review.          (x) Observation Status Appropriate - This patient does not meet hospital inpatient criteria and is placed in observation status. If this patient's primary payer is Medicare and was admitted as an inpatient, Condition Code 44 should be used and patient status changed to \"observation\".     RATIONALE FOR DETERMINATION   18-year-old male was admitted to [facility] with concern for upper GI bleed.  Patient presented with a hemoglobin of 7.6 and received 1 unit of PRBC.  Hemoglobin stable at 9.2.  Upper GI endoscopy was unremarkable.  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  No plan for further work-up at this time.  Patient will be on empiric PPI.  He is discharging home today.  Patient had a short stay in the hospital.  Does not meet inpatient criteria.    The severity of illness, intensity of service provided, expected LOS and risk for adverse outcome make the care appropriate for further observation; however, doesn't meet criteria for hospital inpatient admission. Dr Crockett notified of this determination.    This document was produced using voice recognition software.      The information on this document is developed by the utilization review team in order for the business office to ensure compliance.  This only denotes the appropriateness of proper admission status and does not reflect the quality of care rendered.         The definitions of Inpatient Status and Observation Status used in making the determination above are those provided in the CMS Coverage Manual, Chapter " 1 and Chapter 6, section 70.4.      Sincerely,  Sha Bradford MD    Utilization Review  Physician Advisor  [facility].

## 2023-07-21 NOTE — PLAN OF CARE
Goal Outcome Evaluation:      Plan of Care Reviewed With: patient    Overall Patient Progress: improvingOverall Patient Progress: improving     VS: VSS on RA.  Pain: Denies pain.   Lines: 2 PIV SL on right hand.   Cognitive / Neruo: Aox4. Neuro intact.   Respiratory: LS: clear. Denies SOB.  Cardiac: Denies CP. Tele: None.   Peripheral Neurovascular: No edema. Denies numbness, tingling, and tenderness. Pulses 2+.   GI: Last BM: _7/20/23__. Denies N/V.   : Voids spontaneously.   Skin: WDL. See flowsheet for skin assessment.   Diet: NPO after midnight.   Activity: Assist standby.   Plan: Continue w/ POC.      Stopped blood transfusion. Hemoglobin draw this morning. Safety maintained. Slept through the night.

## 2023-07-21 NOTE — DISCHARGE SUMMARY
"[facility]  Hospitalist Discharge Summary      Date of Admission:  7/20/2023  Date of Discharge:  7/21/2023  Discharging Provider: Conner Crockett MD  Discharge Service: Hospitalist Service    Discharge Diagnoses       Acute blood loss anemia due to GI bleed.    Endoscopy did not show any gastric or duodenal ulcer.    Likely NSAID induced small intestinal ulcer.    Clinically Significant Risk Factors     # Overweight: Estimated body mass index is 25.94 kg/m  as calculated from the following:    Height as of this encounter: 1.803 m (5' 11\").    Weight as of this encounter: 84.4 kg (186 lb).       Follow-ups Needed After Discharge   Follow-up Appointments     Follow-up and recommended labs and tests       Follow up with primary care provider, Physician No Ref-Primary, within 14   days for hospital follow- up.  The following labs/tests are recommended:   CBC.            Discharge Disposition   Discharged to home  Condition at discharge: Stable    Hospital Course   18-year-old male with no significant past medical history other than frequent headaches who presented with shortness of breath on exertion and decreased exercise tolerance.  He was found to have hemoglobin of 7.8.  He noticed maroon and melanotic stools.  He has been taking ibuprofen and aspirin regularly for his headaches.    His hemoglobin on presentation was 8.3 which dropped down to 7.6 but stabilized since then.  He did not need any blood transfusion and hemoglobin at discharge was 9.2.  He underwent upper GI endoscopy which did not reveal any source of bleeding.  Given his BUN/creatinine ratio was elevated on admission at 32.9/1.15, this was likely upper GI bleed.  He probably has an ulcer in the small intestine and will be discharged on 1 week of Protonix.  Patient was instructed to seek medical attention again if there is recurrent bleeding, dizziness, chest pain or shortness of breath.  He will be discharged with iron and vitamin " C supplementation.    Consultations This Hospital Stay   GASTROENTEROLOGY IP CONSULT    Code Status   Full Code    Time Spent on this Encounter   I, Conner Crockett MD, personally saw the patient today and spent less than or equal to 30 minutes discharging this patient.       Conner Crockett MD  [place] [address] [facility]  [address]  [facility] [address]  Phone: [phone]  Fax: [phone]  ______________________________________________________________________    Physical Exam   Vital Signs: Temp: 97.5  F (36.4  C) Temp src: Temporal BP: 114/65 Pulse: 61   Resp: 16 SpO2: 98 % O2 Device: None (Room air) Oxygen Delivery: 2 LPM  Weight: 186 lbs 0 oz  Alert awake and oriented x3.  Chest clear to auscultation.  Abdomen is soft and nontender.  Extremities are soft and nontender.       Primary Care Physician   Physician No Ref-Primary    Discharge Orders      Reason for your hospital stay    GI bleed     Follow-up and recommended labs and tests     Follow up with primary care provider, Physician No Ref-Primary, within 14 days for hospital follow- up.  The following labs/tests are recommended: CBC.     Activity    Your activity upon discharge: activity as tolerated     Diet    Follow this diet upon discharge: Orders Placed This Encounter      Regular Diet Adult       Significant Results and Procedures   Most Recent 3 CBC's:Recent Labs   Lab Test 07/21/23  1154 07/21/23  0727 07/21/23  0046 07/20/23  1550 07/19/23  2217   WBC  --  5.5  --  6.5 8.4   HGB 9.2* 8.7* 8.8* 8.3* 7.6*   MCV  --  89  --  88 87   PLT  --  223  --  267 225     Most Recent 3 BMP's:Recent Labs   Lab Test 07/21/23  0727 07/20/23  1550 07/19/23  2043    138 137   POTASSIUM 4.4 4.1 3.8   CHLORIDE 105 103 101   CO2 26 26 25   BUN 16.4 21.0* 32.9*   CR 1.17 1.14 1.15   ANIONGAP 10 9 11   WILBERT 8.8 9.0 9.0   * 103* 100*     Most Recent 2 LFT's:Recent Labs   Lab Test 07/21/23  0727 07/20/23  1550   AST 24 28  27   ALT  22 23  23   ALKPHOS 100 101  102   BILITOTAL 0.2 0.2  0.2   ,   Results for orders placed or performed during the hospital encounter of 07/19/23   XR Chest 2 Views    Narrative    EXAM: XR CHEST 2 VIEWS  LOCATION: [facility]  DATE: 7/19/2023    INDICATION: SOB  COMPARISON: None.      Impression    IMPRESSION: Heart is normal in size. Lungs are clear.       Discharge Medications   Current Discharge Medication List      START taking these medications    Details   docusate sodium (COLACE) 100 MG capsule Take 1 capsule (100 mg) by mouth 2 times daily  Qty: 100 capsule, Refills: 0    Comments: Find iron induced constipation  Associated Diagnoses: Upper GI bleed      ferrous sulfate (FEROSUL) 325 (65 Fe) MG tablet Take 1 tablet (325 mg) by mouth daily (with breakfast)  Qty: 30 tablet, Refills: 0    Associated Diagnoses: Upper GI bleed      pantoprazole (PROTONIX) 40 MG EC tablet Take 1 tablet (40 mg) by mouth daily  Qty: 7 tablet, Refills: 0    Associated Diagnoses: Upper GI bleed      vitamin C (ASCORBIC ACID) 250 MG tablet Take 1 tablet (250 mg) by mouth daily  Qty: 30 tablet, Refills: 0    Comments: Take with iron pills  Associated Diagnoses: Upper GI bleed           Allergies   No Known Allergies

## 2023-07-21 NOTE — PLAN OF CARE
Provided patient with discharge medication. Removed 2 PIVs. Reviewed AVS with patient and mother. Addressed all questions/concerns. Patient declined wheelchair ride.

## 2023-07-21 NOTE — ED PROVIDER NOTES
"  History     Chief Complaint:  Bloody Stools     HPI   Brian Hooks is a 18 year old male who presents to the ED with black stools in the setting of two days of worsening lightheadedness, dizziness, and shortness of breath. Patient works as an  installing solar panels and his mother says that his symptoms were severely affecting his work yesterday, as he would place one solar panel and then have to lay down immediately afterwards due to lightheadedness. His symptoms are generally worsened when he changes positions. He was seen in the ED yesterday for lightheadedness and shortness of breath but denied any bloody stools or other sources of bleeding at that time. He then noticed that his stool was black last night. He had another episode of black stool with a \"red aura\" around it this morning. Patient denies hematuria and any external bleeding wounds.  No vomiting. Brian has not taken any Pepto Bismol or iron supplements recently. Of note, patient was given a prescription for 600 mg Ibuprofen for headaches and pain after his wisdom teeth removal and has been taking this medication every day for the past month. No previous abdominal surgeries. Patient has not had any heart or lung problems in the past. No personal or family history of bleeding disorders.  No prior EGD.    Independent Historian:   Parent - They report additional history as above.    Review of External Notes:   I personally performed electronic chart review, I see that his hemoglobin was 8.3 yesterday and 7.6 on recheck.  Yesterday's BUN to creatinine ratio was approximately 30.    Medications:    The patient is not currently taking any prescribed medications.    Past Medical History:    Symptomatic anemia     Physical Exam     Patient Vitals for the past 24 hrs:   BP Temp Temp src Pulse Resp SpO2 Height Weight   07/20/23 1940 (!) 145/73 98.2  F (36.8  C) Oral -- 18 98 % -- --   07/20/23 1548 (!) 148/73 97.6  F (36.4  C) Temporal 77 16 100 " "% 1.803 m (5' 11\") 86.2 kg (190 lb)      Physical Exam  General: Male semirecumbent in room 19, mother at bedside  HENT: mucous membranes moist  CV: rate as above, regular rhythm, no murmur audible  Resp: normal effort, speaks in full phrases, no stridor, no cough observed  GI: abdomen soft and nontender, no guarding  Rectal: deferred   MSK: no bony tenderness  Skin: appropriately warm and dry  Neuro: alert, clear speech, oriented, normal mental status, symmetric strength, no nuchal rigidity  Psych: cooperative, pleasant    Emergency Department Course   ECG  ECG results from 07/20/23   EKG 12-lead, tracing only     Value    Systolic Blood Pressure     Diastolic Blood Pressure     Ventricular Rate 73    Atrial Rate 73    FL Interval 168    QRS Duration 96        QTc 436    P Axis 64    R AXIS 80    T Axis 37    Interpretation ECG      Sinus rhythm       Laboratory:  Labs Ordered and Resulted from Time of ED Arrival to Time of ED Departure   COMPREHENSIVE METABOLIC PANEL - Abnormal       Result Value    Sodium 138      Potassium 4.1      Chloride 103      Carbon Dioxide (CO2) 26      Anion Gap 9      Urea Nitrogen 21.0 (*)     Creatinine 1.14      Calcium 9.0      Glucose 103 (*)     Alkaline Phosphatase 102      AST 27      ALT 23      Protein Total 6.2 (*)     Albumin 4.0      Bilirubin Total 0.2      GFR Estimate >90     CBC WITH PLATELETS AND DIFFERENTIAL - Abnormal    WBC Count 6.5      RBC Count 2.80 (*)     Hemoglobin 8.3 (*)     Hematocrit 24.6 (*)     MCV 88      MCH 29.6      MCHC 33.7      RDW 12.3      Platelet Count 267      % Neutrophils 55      % Lymphocytes 35      % Monocytes 7      % Eosinophils 2      % Basophils 0      % Immature Granulocytes 1      NRBCs per 100 WBC 0      Absolute Neutrophils 3.6      Absolute Lymphocytes 2.3      Absolute Monocytes 0.4      Absolute Eosinophils 0.2      Absolute Basophils 0.0      Absolute Immature Granulocytes 0.1      Absolute NRBCs 0.0     HEPATIC " FUNCTION PANEL - Abnormal    Protein Total 6.3 (*)     Albumin 3.9      Bilirubin Total 0.2      Alkaline Phosphatase 101      AST 28      ALT 23      Bilirubin Direct <0.20     INR - Normal    INR 1.00     TYPE AND SCREEN, ADULT    ABO/RH(D) O POS      Antibody Screen Negative      SPECIMEN EXPIRATION DATE [id number]     PREPARE RED BLOOD CELLS (UNIT)    Blood Component Type Red Blood Cells      Product Code U2707D50      Unit Status Ready for issue      Unit Number [id number]      CROSSMATCH Compatible      CODING SYSTEM [id number]     PREPARE RED BLOOD CELLS (UNIT)   ABO/RH TYPE AND SCREEN      Emergency Department Course & Assessments:  Interventions:  Medications   pantoprazole (PROTONIX) IV push injection 40 mg (40 mg Intravenous $Given 7/20/23 1956)   1 RBC Unit transfusion    Assessments:  1919 I obtained history and examined the patient as noted above.    Independent Interpretation (X-rays, CTs, rhythm strip):  None    Consultations/Discussion of Management or Tests:  2054 I spoke with Dr. Hall, hospitalist, who accepts the patient.     Social Determinants of Health affecting care:   None    Disposition:  The patient was admitted to the hospital under the care of Dr. Hall.     Impression & Plan      Medical Decision Making:  He presents with ongoing symptomatic anemia and now reports of black stool, highly suggestive of an upper GI bleed which itself is likely secondary to his steady use of NSAIDs over the past month or so.  He has no abdominal pain or tenderness, so I do not think that emergent abdominal imaging is indicated though I do think he would benefit from expedited upper endoscopy for diagnostic and hopefully therapeutic purposes.  Vital signs are satisfactory, though he describes quite significant symptoms and therefore I felt he would benefit from transfusion of red blood cells, he was consented for this both verbally and in writing.  Mother present for these discussions as well, and also an  extended family member who is a physician was involved by phone on speaker phone in the patient's room with his consent, all involved parties agree with this plan of care.  Antacid medication given.  He is not on blood thinners and no indication for reversal.  No stigmata of end-stage liver disease and I highly doubt variceal bleed.  Peptic ulcer disease is highest on the differential though AV malformation, neoplasm, and others are possible.  Given his clinical parameters at this time there is no indication for immediate GI consultation though this will be necessary as part of his hospitalization.  He is admitted to the hospitalist service for further multidisciplinary care.  He will be kept n.p.o. after midnight in anticipation of upper endoscopy.    Diagnosis:    ICD-10-CM    1. Symptomatic anemia  D64.9       2. Upper GI bleed  K92.2       3. Excessive use of nonsteroidal anti-inflammatory drugs (NSAIDs)  F19.90            Scribe Disclosure:  Nirmala DELGADO, am serving as a scribe at 7:16 PM on 7/20/2023 to document services personally performed by Geovany Mustafa MD based on my observations and the provider's statements to me.   7/20/2023   Geovany Mustafa MD Reitsema, Jeffrey Alan, MD  07/21/23 0021

## 2023-07-21 NOTE — H&P
"[facility]    History and Physical - Hospitalist Service       Date of Admission:  7/20/2023    Assessment & Plan      Brian Hooks is a 18 year old male admitted on 7/20/2023. He presents with symptomatic anemia and symptoms of upper GI bleed.  1)upper GI bleed:    -This appears to be likely etiology of the patient's anemia as acute blood loss anemia  - Avoid ASA/NSAIDS   -Protonix 40 mg given in the ED will add a additional 40 mg loading dose and then 40 mg IV every 12 hours.   -GI consult   -We will transfuse x1 unit for symptomatic anemia    -Hemoglobin every 6h  2) symptomatic anemia     -Lab studies otherwise negative no evidence of hemolysis he also had a blood smear done which does not reveal any evidence of hemolytic changes    -Transfused x1 unit as above    -Follow H&H    -Evaluate upper GI tract is likely source GI blood loss    -borderline elevated retic count suggest acute blood loss as etiology as well     Diet:  N.p.o. after midnight for likely EGD in a.m.  DVT Prophylaxis: VTE Prophylaxis contraindicated due to GI bleed  John Catheter: Not present  Lines: None     Cardiac Monitoring: None  Code Status:  Code    Clinically Significant Risk Factors Present on Admission                       # Overweight: Estimated body mass index is 26.5 kg/m  as calculated from the following:    Height as of this encounter: 1.803 m (5' 11\").    Weight as of this encounter: 86.2 kg (190 lb).            Disposition Plan      Expected Discharge Date: 07/22/2023                  Amhet Hall MD  Hospitalist Service  [facility]  Securely message with [employer] (more info)  Text page via [employer] Paging/Directory     ______________________________________________________________________    Chief Complaint   maroon colored black stools.  Lightheadedness dizziness/shortness of breath    History is obtained from the patient    History of Present Illness   Brian Hooks is a " 18 year old male with no significant past medical history other than frequent headaches recently presented to the ED yesterday with complaints of fatigue and dizziness headedness when he stands up or changing position.  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  Significant shortness of breath with exertion and activity.  Found to have a hemoglobin of 7.8 yesterday.  He had a anemia evaluation undertaken in which we now have results of a normal ferritin normal iron sat normal B12 folate and reticulocyte count with a retake count was right at the upper limits of normal of 2.0%.  LDK was normal bilirubin was normal.  Is asked about blood in his stool yesterday but he denied it but then recalled that he never really paid any attention to his stool.  Leaving the ED yesterday he is now noticed dark bloody stools that are maroon in color with melanotic quality to them as well.  He had been set up with oncology to follow-up on the anemia called and stated he having bloody stools and was referred back to the ED.  Hemoglobin in the ED is actually little bit higher than yesterday but is more clear today that he is having symptoms of upper GI bleed.  He has been regularly taking ibuprofen and aspirin due to regular headaches.  Receiving 1 unit of blood is a does have symptomatic anemia [place] for further evaluation and treatment.  Denies any abdominal pain denies any nausea vomiting hematemesis.      Past Medical History    No past medical history on file.    Past Surgical History   No past surgical history on file.    Prior to Admission Medications   None        Review of Systems    The 10 point Review of Systems is negative other than noted in the HPI or here.     Social History   I have reviewed this patient's social history and updated it with pertinent information if needed.       Family History       No significant family history known  history of inflammatory bowel disease or GI abnormalities.  Allergies   No Known Allergies     Physical Exam   Vital Signs: Temp: 98.1  F (36.7  C) Temp src: Oral BP: (!) 141/64 Pulse: 83   Resp: 20 SpO2: 98 % O2 Device: None (Room air)    Weight: 190 lbs 0 oz    Constitutional: awake, alert, cooperative, no apparent distress, and appears stated age  Eyes: Lids and lashes normal, pupils equal, round and reactive to light, extra ocular muscles intact, sclera clear, conjunctiva normal and sclera clear  ENT: Normocephalic, without obvious abnormality, atraumatic, sinuses nontender on palpation, external ears without lesions, oral pharynx with moist mucous membranes, tonsils without erythema or exudates, gums normal and good dentition.  Respiratory: No increased work of breathing, good air exchange, clear to auscultation bilaterally, no crackles or wheezing  Cardiovascular: Normal apical impulse, regular rate and rhythm, normal S1 and S2, no S3 or S4, and no murmur noted  GI: No scars, normal bowel sounds, soft, non-distended, non-tender, no masses palpated, no hepatosplenomegally  Skin: no bruising or bleeding, no rashes and no lesions  Musculoskeletal: There is no redness, warmth, or swelling of the joints.  Full range of motion noted.  Motor strength is 5 out of 5 all extremities bilaterally.  Tone is normal.  no lower extremity pitting edema present  Neurologic: Awake, alert, oriented to name, place and time.  Cranial nerves II-XII are grossly intact.  Motor is 5 out of 5 bilaterally.  Cerebellar finger to nose, heel to shin intact.  Sensory is intact.  Babinski down going, Romberg negative, and gait is normal.  Motor Exam:  moves all extremities well and symmetrically  Sensory:  Sensory intact    Medical Decision Making       ED provider MINUTES SPENT BY ME on the date of service doing chart review, history, exam, documentation & further activities per the note.  MANAGEMENT DISCUSSED with the following over the  past 24 hours: 45       Data     I have personally reviewed the following data over the past 24 hrs:    6.5  \   8.3 (L)   / 267     138 103 21.0 (H) /  103 (H)   4.1 26 1.14 \       ALT: 23; 23 AST: 27; 28 AP: 102; 101 TBILI: 0.2; 0.2   ALB: 4.0; 3.9 TOT PROTEIN: 6.2 (L); 6.3 (L) LIPASE: N/A       TSH: N/A T4: N/A A1C: N/A       INR:  1.00 PTT:  N/A   D-dimer:  N/A Fibrinogen:  N/A       Ferritin:  N/A % Retic:  1.8 LDH:  N/A        Results for orders placed or performed during the hospital encounter of 07/20/23 (from the past 24 hour(s))   CBC + differential    Narrative    The following orders were created for panel order CBC + differential.  Procedure                               Abnormality         Status                     ---------                               -----------         ------                     CBC with platelets and d...[[id number]]  Abnormal            Final result                 Please view results for these tests on the individual orders.   Comprehensive metabolic panel   Result Value Ref Range    Sodium 138 136 - 145 mmol/L    Potassium 4.1 3.4 - 5.3 mmol/L    Chloride 103 98 - 107 mmol/L    Carbon Dioxide (CO2) 26 22 - 29 mmol/L    Anion Gap 9 7 - 15 mmol/L    Urea Nitrogen 21.0 (H) 6.0 - 20.0 mg/dL    Creatinine 1.14 0.67 - 1.17 mg/dL    Calcium 9.0 8.6 - 10.0 mg/dL    Glucose 103 (H) 70 - 99 mg/dL    Alkaline Phosphatase 102 40 - 129 U/L    AST 27 0 - 45 U/L    ALT 23 0 - 70 U/L    Protein Total 6.2 (L) 6.4 - 8.3 g/dL    Albumin 4.0 3.5 - 5.2 g/dL    Bilirubin Total 0.2 <=1.2 mg/dL    GFR Estimate >90 >60 mL/min/1.73m2   Extra Tube ([place] Draw)    Narrative    The following orders were created for panel order Extra Tube ([place] Draw).  Procedure                               Abnormality         Status                     ---------                               -----------         ------                     Extra Blue Top Tube[[id number]]                              Final result                Extra Red Top Tube[[id number]]                               Final result               Extra Blood Bank Purple ...[[id number]]                      Final result               Extra Blood Bank Purple ...[[id number]]                      Final result                 Please view results for these tests on the individual orders.   CBC with platelets and differential   Result Value Ref Range    WBC Count 6.5 4.0 - 11.0 10e3/uL    RBC Count 2.80 (L) 4.40 - 5.90 10e6/uL    Hemoglobin 8.3 (L) 13.3 - 17.7 g/dL    Hematocrit 24.6 (L) 40.0 - 53.0 %    MCV 88 78 - 100 fL    MCH 29.6 26.5 - 33.0 pg    MCHC 33.7 31.5 - 36.5 g/dL    RDW 12.3 10.0 - 15.0 %    Platelet Count 267 150 - 450 10e3/uL    % Neutrophils 55 %    % Lymphocytes 35 %    % Monocytes 7 %    % Eosinophils 2 %    % Basophils 0 %    % Immature Granulocytes 1 %    NRBCs per 100 WBC 0 <1 /100    Absolute Neutrophils 3.6 1.6 - 8.3 10e3/uL    Absolute Lymphocytes 2.3 0.8 - 5.3 10e3/uL    Absolute Monocytes 0.4 0.0 - 1.3 10e3/uL    Absolute Eosinophils 0.2 0.0 - 0.7 10e3/uL    Absolute Basophils 0.0 0.0 - 0.2 10e3/uL    Absolute Immature Granulocytes 0.1 <=0.4 10e3/uL    Absolute NRBCs 0.0 10e3/uL   Extra Blue Top Tube   Result Value Ref Range    Hold Specimen [facility]    Extra Red Top Tube   Result Value Ref Range    Hold Specimen [facility]    Extra Blood Bank Purple Top Tube   Result Value Ref Range    Hold Specimen [facility]    Extra Blood Bank Purple Top Tube   Result Value Ref Range    Hold Specimen [facility]    Hepatic panel   Result Value Ref Range    Protein Total 6.3 (L) 6.4 - 8.3 g/dL    Albumin 3.9 3.5 - 5.2 g/dL    Bilirubin Total 0.2 <=1.2 mg/dL    Alkaline Phosphatase 101 40 - 129 U/L    AST 28 0 - 45 U/L    ALT 23 0 - 70 U/L    Bilirubin Direct <0.20 0.00 - 0.30 mg/dL   INR   Result Value Ref Range    INR 1.00 0.85 - 1.15   ABO/Rh type and screen    Narrative    The following orders were created for panel order ABO/Rh type and screen.  Procedure                                Abnormality         Status                     ---------                               -----------         ------                     Adult Type and Screen[[id number]]                            Edited Result - FINAL        Please view results for these tests on the individual orders.   Adult Type and Screen   Result Value Ref Range    ABO/RH(D) O POS     Antibody Screen Negative Negative    SPECIMEN EXPIRATION DATE [id number]    EKG 12-lead, tracing only   Result Value Ref Range    Systolic Blood Pressure  mmHg    Diastolic Blood Pressure  mmHg    Ventricular Rate 73 BPM    Atrial Rate 73 BPM    NC Interval 168 ms    QRS Duration 96 ms     ms    QTc 436 ms    P Axis 64 degrees    R AXIS 80 degrees    T Axis 37 degrees    Interpretation ECG       Sinus rhythm with marked sinus arrhythmia  Otherwise normal ECG  When compared with ECG of 19-JUL-2023 20:22,  No significant change was found     Prepare red blood cells (unit)   Result Value Ref Range    Blood Component Type Red Blood Cells     Product Code [id number]     Unit Status Transfused     Unit Number [id number]     CROSSMATCH Compatible     CODING SYSTEM [id number]     ISSUE DATE AND TIME [id number]     UNIT ABO/RH O+     UNIT TYPE ISBT 5100

## 2023-07-21 NOTE — PHARMACY-ADMISSION MEDICATION HISTORY
Pharmacist Admission Medication History    Admission medication history is complete. The information provided in this note is only as accurate as the sources available at the time of the update.    Medication reconciliation/reorder completed by provider prior to medication history? No    Information Source(s): Patient via in-person    Pertinent Information: None    Changes made to PTA medication list:    Added: all    Deleted: None    Changed: None    Medication Affordability:  Not including over the counter (OTC) medications, was there a time in the past 3 months when you did not take your medications as prescribed because of cost?: No    Allergies reviewed with patient and updates made in EHR: yes    Medication History Completed By: Leyla Ingram [facility] 7/21/2023 12:20 PM    Prior to Admission medications    Medication Sig Last Dose Taking? Auth Provider Long Term End Date   CREATINE PO Take 1 tablet by mouth daily  Yes Unknown, Entered By History                       ibuprofen (ADVIL/MOTRIN) 600 MG tablet Take 600 mg by mouth daily  Yes Unknown, Entered By History              UNABLE TO FIND Take by mouth daily MEDICATION NAME: Sea Mon supplement  Yes Unknown, Entered By History

## 2023-07-21 NOTE — PLAN OF CARE
Goal Outcome Evaluation:       Vitals stable and afebrile. Denies pain. Receiving unit pRBC. Denies SOB, itching, hives, pain. Family present and supportive.

## 2023-07-21 NOTE — ED NOTES
"[facility]  ED Nurse Handoff Report    ED Chief complaint: Rectal Bleeding  . ED Diagnosis:   Final diagnoses:   Symptomatic anemia   Upper GI bleed   Excessive use of nonsteroidal anti-inflammatory drugs (NSAIDs)       Allergies: No Known Allergies    Code Status: Full Code    Activity level - Baseline/Home:  independent.  Activity Level - Current:   independent.   Lift room needed: No.   Bariatric: No   Needed: No   Isolation: No.   Infection: Not Applicable.     Respiratory status: Room air    Vital Signs (within 30 minutes):   Vitals:    07/20/23 1548 07/20/23 1940   BP: (!) 148/73 (!) 145/73   Pulse: 77    Resp: 16 18   Temp: 97.6  F (36.4  C) 98.2  F (36.8  C)   TempSrc: Temporal Oral   SpO2: 100% 98%   Weight: 86.2 kg (190 lb)    Height: 1.803 m (5' 11\")        Cardiac Rhythm:  ,      Pain level:    Patient confused: No.   Patient Falls Risk: nonskid shoes/slippers when out of bed, patient and family education, assistive device/personal items within reach and activity supervised.   Elimination Status: Has voided     Patient Report - Initial Complaint: Rectal Bleeding.   Focused Assessment: Brian Hooks is a 18 year old male who presents to the ED with bloody stools in the setting of two days of worsening lightheadedness, dizziness, and shortness of breath. Patient works as an  installing solar panels and his mother says that his symptoms were severely affecting his work yesterday, as he would place one solar panel and then have to lay down immediately afterwards due to lightheadedness. His symptoms are worsened when he changes positions. He was seen in the ED yesterday for lightheadedness and shortness of breath but denied any bloody stools or other sources of bleeding at this time. He then noticed that his stool was black last night. He had another episode of black stool with a \"red aura\" around it this morning. Patient denies hematuria and any external " bleeding. No vomiting. Brian has not taken any Pepto Bismol or iron supplements recently. Of note, patient was given a prescription for 600 mg Ibuprofen for headaches and pain after his wisdom teeth removal and has been taking this medication every day for the past month. No previous abdominal surgeries. Patient has not had any heart or lung problems in the past. No personal or family history of bleeding disorders.        Abnormal Results:   Labs Ordered and Resulted from Time of ED Arrival to Time of ED Departure   COMPREHENSIVE METABOLIC PANEL - Abnormal       Result Value    Sodium 138      Potassium 4.1      Chloride 103      Carbon Dioxide (CO2) 26      Anion Gap 9      Urea Nitrogen 21.0 (*)     Creatinine 1.14      Calcium 9.0      Glucose 103 (*)     Alkaline Phosphatase 102      AST 27      ALT 23      Protein Total 6.2 (*)     Albumin 4.0      Bilirubin Total 0.2      GFR Estimate >90     CBC WITH PLATELETS AND DIFFERENTIAL - Abnormal    WBC Count 6.5      RBC Count 2.80 (*)     Hemoglobin 8.3 (*)     Hematocrit 24.6 (*)     MCV 88      MCH 29.6      MCHC 33.7      RDW 12.3      Platelet Count 267      % Neutrophils 55      % Lymphocytes 35      % Monocytes 7      % Eosinophils 2      % Basophils 0      % Immature Granulocytes 1      NRBCs per 100 WBC 0      Absolute Neutrophils 3.6      Absolute Lymphocytes 2.3      Absolute Monocytes 0.4      Absolute Eosinophils 0.2      Absolute Basophils 0.0      Absolute Immature Granulocytes 0.1      Absolute NRBCs 0.0     HEPATIC FUNCTION PANEL - Abnormal    Protein Total 6.3 (*)     Albumin 3.9      Bilirubin Total 0.2      Alkaline Phosphatase 101      AST 28      ALT 23      Bilirubin Direct <0.20     INR - Normal    INR 1.00     PREPARE RED BLOOD CELLS (UNIT)   TYPE AND SCREEN, ADULT   ABO/RH TYPE AND SCREEN        No orders to display       Treatments provided: Labs, IV, IV meds  Family Comments: at bedside (mom)  OBS brochure/video discussed/provided to  patient:  No  ED Medications:   Medications   pantoprazole (PROTONIX) IV push injection 40 mg (40 mg Intravenous $Given 7/20/23 1956)       Drips infusing:  No  For the majority of the shift this patient was Green.   Interventions performed were none.    Sepsis treatment initiated: No    Cares/treatment/interventions/medications to be completed following ED care: See chart    ED Nurse Name: Suzy Riggins RN  8:09 PM    RECEIVING UNIT ED HANDOFF REVIEW    Above ED Nurse Handoff Report was reviewed: Yes  Reviewed by: Jania Harris RN on July 20, 2023 at 11:13 PM

## 2023-07-23 ENCOUNTER — PATIENT OUTREACH (OUTPATIENT)
Dept: CARE COORDINATION | Facility: CLINIC | Age: 18
End: 2023-07-23
Payer: COMMERCIAL

## 2023-07-23 NOTE — PROGRESS NOTES
[facility] Care Resource Center Contact  [facility]/Voicemail     Clinical Data: Transitional Care Management Outreach     Outreach attempted x 2.  Left message on patient's voicemail, providing [facility]'s 24/7 scheduling and nurse triage phone number [phone] ([phone]) for questions/concerns and/or to schedule an appt with an [facility] provider, if they do not have a PCP.      Plan:  [facility] will do no further outreaches at this time.       Evelin Rivera RN  Connected Care Resource [place], [facility]    *Connected Care Resource Team does NOT follow patient ongoing. Referrals are identified based on internal discharge reports and the outreach is to ensure patient has an understanding of their discharge instructions.

## 2023-08-13 ENCOUNTER — HEALTH MAINTENANCE LETTER (OUTPATIENT)
Age: 18
End: 2023-08-13

## 2024-01-11 ENCOUNTER — OFFICE VISIT (OUTPATIENT)
Dept: URGENT CARE | Facility: URGENT CARE | Age: 19
End: 2024-01-11
Payer: COMMERCIAL

## 2024-01-11 ENCOUNTER — ANCILLARY PROCEDURE (OUTPATIENT)
Dept: GENERAL RADIOLOGY | Facility: CLINIC | Age: 19
End: 2024-01-11
Attending: FAMILY MEDICINE
Payer: COMMERCIAL

## 2024-01-11 VITALS
RESPIRATION RATE: 16 BRPM | HEART RATE: 55 BPM | TEMPERATURE: 98.1 F | BODY MASS INDEX: 26.36 KG/M2 | SYSTOLIC BLOOD PRESSURE: 146 MMHG | WEIGHT: 189 LBS | DIASTOLIC BLOOD PRESSURE: 82 MMHG | OXYGEN SATURATION: 100 %

## 2024-01-11 DIAGNOSIS — R10.84 ABDOMINAL PAIN, GENERALIZED: Primary | ICD-10-CM

## 2024-01-11 DIAGNOSIS — R10.84 ABDOMINAL PAIN, GENERALIZED: ICD-10-CM

## 2024-01-11 LAB
ALBUMIN SERPL-MCNC: 4.5 G/DL (ref 3.4–5)
ALP SERPL-CCNC: 109 U/L (ref 40–150)
ALT SERPL W P-5'-P-CCNC: 32 U/L (ref 0–70)
ANION GAP SERPL CALCULATED.3IONS-SCNC: 13 MMOL/L (ref 3–14)
AST SERPL W P-5'-P-CCNC: 32 U/L (ref 0–45)
BASOPHILS # BLD AUTO: 0 10E3/UL (ref 0–0.2)
BASOPHILS NFR BLD AUTO: 0 %
BILIRUB SERPL-MCNC: 0.8 MG/DL (ref 0.2–1.3)
BUN SERPL-MCNC: 18 MG/DL (ref 7–30)
CALCIUM SERPL-MCNC: 10.4 MG/DL (ref 8.5–10.1)
CHLORIDE BLD-SCNC: 105 MMOL/L (ref 94–109)
CO2 SERPL-SCNC: 30 MMOL/L (ref 20–32)
CREAT SERPL-MCNC: 1.2 MG/DL (ref 0.66–1.25)
EGFRCR SERPLBLD CKD-EPI 2021: 89 ML/MIN/1.73M2
EOSINOPHIL # BLD AUTO: 0.1 10E3/UL (ref 0–0.7)
EOSINOPHIL NFR BLD AUTO: 1 %
ERYTHROCYTE [DISTWIDTH] IN BLOOD BY AUTOMATED COUNT: 12.9 % (ref 10–15)
GLUCOSE BLD-MCNC: 104 MG/DL (ref 70–99)
HCT VFR BLD AUTO: 47.2 % (ref 40–53)
HGB BLD-MCNC: 15.4 G/DL (ref 13.3–17.7)
IMM GRANULOCYTES # BLD: 0 10E3/UL
IMM GRANULOCYTES NFR BLD: 0 %
LYMPHOCYTES # BLD AUTO: 2.3 10E3/UL (ref 0.8–5.3)
LYMPHOCYTES NFR BLD AUTO: 27 %
MCH RBC QN AUTO: 29.5 PG (ref 26.5–33)
MCHC RBC AUTO-ENTMCNC: 32.6 G/DL (ref 31.5–36.5)
MCV RBC AUTO: 90 FL (ref 78–100)
MONOCYTES # BLD AUTO: 0.6 10E3/UL (ref 0–1.3)
MONOCYTES NFR BLD AUTO: 7 %
NEUTROPHILS # BLD AUTO: 5.5 10E3/UL (ref 1.6–8.3)
NEUTROPHILS NFR BLD AUTO: 65 %
PLATELET # BLD AUTO: 236 10E3/UL (ref 150–450)
POTASSIUM BLD-SCNC: 4.9 MMOL/L (ref 3.4–5.3)
PROT SERPL-MCNC: 7.6 G/DL (ref 6.8–8.8)
RBC # BLD AUTO: 5.22 10E6/UL (ref 4.4–5.9)
SODIUM SERPL-SCNC: 148 MMOL/L (ref 135–145)
WBC # BLD AUTO: 8.5 10E3/UL (ref 4–11)

## 2024-01-11 PROCEDURE — 99203 OFFICE O/P NEW LOW 30 MIN: CPT | Performed by: FAMILY MEDICINE

## 2024-01-11 PROCEDURE — 80053 COMPREHEN METABOLIC PANEL: CPT | Performed by: FAMILY MEDICINE

## 2024-01-11 PROCEDURE — 74019 RADEX ABDOMEN 2 VIEWS: CPT | Mod: TC | Performed by: RADIOLOGY

## 2024-01-11 PROCEDURE — 85025 COMPLETE CBC W/AUTO DIFF WBC: CPT | Performed by: FAMILY MEDICINE

## 2024-01-11 PROCEDURE — 36415 COLL VENOUS BLD VENIPUNCTURE: CPT | Performed by: FAMILY MEDICINE

## 2024-01-11 NOTE — PROGRESS NOTES
SUBJECTIVE:  Chief Complaint   Patient presents with    Abdominal Pain     Bloating, fullness,  uncomfortable,  x1 week     Constipation     Haven't had enough bowel in last 1 week. Tried colace and gas X pill      Brian Hooks is a 18 year old male who presents with a chief complaint of abdominal pain, bloating, fullness X 1 week.    Did have BM today, was not a lot but states that was soft.  Has been having more generalized abdominal pain.  States that has been taking more creatin supplement and not sure if this is causing the problems, has not been going to the gym.    Took dulcolax and did not help, had watery stools and abdominal cramping but feels like still full.  Did try miralax initially but did not find this helpful.    Has history of GI bleed, seen in ER.  Has not had follow up but states that was going to give blood but could not as he gave blood about 3 months ago, had a finger prick test done and was told that was okay.      No past medical history on file.  Current Outpatient Medications   Medication Sig Dispense Refill    CREATINE PO Take 1 tablet by mouth daily      docusate sodium (COLACE) 100 MG capsule Take 1 capsule (100 mg) by mouth 2 times daily 100 capsule 0    ferrous sulfate (FEROSUL) 325 (65 Fe) MG tablet Take 1 tablet (325 mg) by mouth daily (with breakfast) (Patient not taking: Reported on 1/11/2024) 30 tablet 0    pantoprazole (PROTONIX) 40 MG EC tablet Take 1 tablet (40 mg) by mouth daily (Patient not taking: Reported on 1/11/2024) 7 tablet 0    vitamin C (ASCORBIC ACID) 250 MG tablet Take 1 tablet (250 mg) by mouth daily (Patient not taking: Reported on 1/11/2024) 30 tablet 0     Social History     Tobacco Use    Smoking status: Never    Smokeless tobacco: Never   Substance Use Topics    Alcohol use: Yes     Comment: occasionally       ROS:  Review of systems negative except as stated above.    EXAM:   BP (!) 146/82 (BP Location: Right arm, Patient Position: Chair, Cuff Size:  Adult Large)   Pulse 55   Temp 98.1  F (36.7  C) (Oral)   Resp 16   Wt 85.7 kg (189 lb)   SpO2 100%   BMI 26.36 kg/m    GENERAL APPEARANCE: healthy, alert and no distress  PSYCH:alert, affect bright    XRAY -abdomen - scattered bowel gas and stool, no air-fluid level, no free air personally viewed by me    Results for orders placed or performed in visit on 01/11/24   CBC with platelets and differential     Status: None   Result Value Ref Range    WBC Count 8.5 4.0 - 11.0 10e3/uL    RBC Count 5.22 4.40 - 5.90 10e6/uL    Hemoglobin 15.4 13.3 - 17.7 g/dL    Hematocrit 47.2 40.0 - 53.0 %    MCV 90 78 - 100 fL    MCH 29.5 26.5 - 33.0 pg    MCHC 32.6 31.5 - 36.5 g/dL    RDW 12.9 10.0 - 15.0 %    Platelet Count 236 150 - 450 10e3/uL    % Neutrophils 65 %    % Lymphocytes 27 %    % Monocytes 7 %    % Eosinophils 1 %    % Basophils 0 %    % Immature Granulocytes 0 %    Absolute Neutrophils 5.5 1.6 - 8.3 10e3/uL    Absolute Lymphocytes 2.3 0.8 - 5.3 10e3/uL    Absolute Monocytes 0.6 0.0 - 1.3 10e3/uL    Absolute Eosinophils 0.1 0.0 - 0.7 10e3/uL    Absolute Basophils 0.0 0.0 - 0.2 10e3/uL    Absolute Immature Granulocytes 0.0 <=0.4 10e3/uL   CBC with platelets and differential     Status: None    Narrative    The following orders were created for panel order CBC with platelets and differential.  Procedure                               Abnormality         Status                     ---------                               -----------         ------                     CBC with platelets and d...[[id number]]                      Final result                 Please view results for these tests on the individual orders.         ASSESSMENT/PLAN:  (R10.49) Abdominal pain, generalized  (primary encounter diagnosis)  Comment: constipation  Plan: CBC with platelets and differential, XR Abdomen        2 Views, Comprehensive metabolic panel            Patient is not in acute distress and reviewed abdominal pain and constipation.   Reviewed importance of hydration, fruits, veggies and fiber.  Okay for miralax 1-2 times a day to help with constipation.  Okay for gas-X to minimize gas.  Will follow up on formal xray report and notify if any abnormalities.  Will follow up on pending labs and notify if any abnormalities.    Follow up with primary provider if no improvement of symptoms in 1 week    Magdiel Arrieta MD  January 11, 2024 6:39 PM

## 2024-01-29 ENCOUNTER — APPOINTMENT (OUTPATIENT)
Dept: CT IMAGING | Facility: CLINIC | Age: 19
End: 2024-01-29
Attending: EMERGENCY MEDICINE
Payer: COMMERCIAL

## 2024-01-29 ENCOUNTER — HOSPITAL ENCOUNTER (EMERGENCY)
Facility: CLINIC | Age: 19
Discharge: HOME | End: 2024-01-29
Attending: EMERGENCY MEDICINE | Admitting: EMERGENCY MEDICINE
Payer: COMMERCIAL

## 2024-01-29 ENCOUNTER — OFFICE VISIT (OUTPATIENT)
Dept: URGENT CARE | Facility: URGENT CARE | Age: 19
End: 2024-01-29
Payer: COMMERCIAL

## 2024-01-29 VITALS
SYSTOLIC BLOOD PRESSURE: 126 MMHG | RESPIRATION RATE: 24 BRPM | DIASTOLIC BLOOD PRESSURE: 75 MMHG | TEMPERATURE: 97.3 F | OXYGEN SATURATION: 96 % | HEART RATE: 77 BPM

## 2024-01-29 VITALS
RESPIRATION RATE: 20 BRPM | TEMPERATURE: 98 F | OXYGEN SATURATION: 97 % | DIASTOLIC BLOOD PRESSURE: 87 MMHG | HEART RATE: 97 BPM | SYSTOLIC BLOOD PRESSURE: 146 MMHG

## 2024-01-29 DIAGNOSIS — R42 DIZZINESS: ICD-10-CM

## 2024-01-29 DIAGNOSIS — R55 SYNCOPE, UNSPECIFIED SYNCOPE TYPE: Primary | ICD-10-CM

## 2024-01-29 DIAGNOSIS — R19.4 CHANGE IN STOOL HABITS: ICD-10-CM

## 2024-01-29 DIAGNOSIS — R10.9 ABDOMINAL DISCOMFORT: ICD-10-CM

## 2024-01-29 DIAGNOSIS — S09.90XA CLOSED HEAD INJURY, INITIAL ENCOUNTER: ICD-10-CM

## 2024-01-29 DIAGNOSIS — R79.89 ELEVATED SERUM CREATININE: ICD-10-CM

## 2024-01-29 DIAGNOSIS — R55 SYNCOPE, UNSPECIFIED SYNCOPE TYPE: ICD-10-CM

## 2024-01-29 DIAGNOSIS — R73.9 HYPERGLYCEMIA: ICD-10-CM

## 2024-01-29 LAB
ALBUMIN UR-MCNC: NEGATIVE MG/DL
ANION GAP SERPL CALCULATED.3IONS-SCNC: 9 MMOL/L (ref 3–14)
ANION GAP SERPL CALCULATED.3IONS-SCNC: 9 MMOL/L (ref 7–15)
APPEARANCE UR: CLEAR
BASOPHILS # BLD AUTO: 0 10E3/UL (ref 0–0.2)
BASOPHILS # BLD AUTO: 0 10E3/UL (ref 0–0.2)
BASOPHILS NFR BLD AUTO: 0 %
BASOPHILS NFR BLD AUTO: 0 %
BILIRUB UR QL STRIP: NEGATIVE
BUN SERPL-MCNC: 15.9 MG/DL (ref 6–20)
BUN SERPL-MCNC: 17 MG/DL (ref 7–30)
CALCIUM SERPL-MCNC: 10 MG/DL (ref 8.5–10.1)
CALCIUM SERPL-MCNC: 9.5 MG/DL (ref 8.6–10)
CHLORIDE BLD-SCNC: 103 MMOL/L (ref 94–109)
CHLORIDE SERPL-SCNC: 101 MMOL/L (ref 98–107)
CO2 SERPL-SCNC: 29 MMOL/L (ref 20–32)
COLOR UR AUTO: NORMAL
CREAT SERPL-MCNC: 1.3 MG/DL (ref 0.66–1.25)
CREAT SERPL-MCNC: 1.33 MG/DL (ref 0.67–1.17)
DEPRECATED HCO3 PLAS-SCNC: 27 MMOL/L (ref 22–29)
EGFRCR SERPLBLD CKD-EPI 2021: 78 ML/MIN/1.73M2
EGFRCR SERPLBLD CKD-EPI 2021: 80 ML/MIN/1.73M2
EOSINOPHIL # BLD AUTO: 0 10E3/UL (ref 0–0.7)
EOSINOPHIL # BLD AUTO: 0 10E3/UL (ref 0–0.7)
EOSINOPHIL NFR BLD AUTO: 0 %
EOSINOPHIL NFR BLD AUTO: 0 %
ERYTHROCYTE [DISTWIDTH] IN BLOOD BY AUTOMATED COUNT: 12.8 % (ref 10–15)
ERYTHROCYTE [DISTWIDTH] IN BLOOD BY AUTOMATED COUNT: 12.8 % (ref 10–15)
GLUCOSE BLD-MCNC: 190 MG/DL (ref 70–99)
GLUCOSE SERPL-MCNC: 122 MG/DL (ref 70–99)
GLUCOSE UR STRIP-MCNC: NEGATIVE MG/DL
HBA1C MFR BLD: 5 % (ref 0–5.6)
HCT VFR BLD AUTO: 46.6 % (ref 40–53)
HCT VFR BLD AUTO: 47.9 % (ref 40–53)
HGB BLD-MCNC: 15.4 G/DL (ref 13.3–17.7)
HGB BLD-MCNC: 15.6 G/DL (ref 13.3–17.7)
HGB UR QL STRIP: NEGATIVE
IMM GRANULOCYTES # BLD: 0 10E3/UL
IMM GRANULOCYTES # BLD: 0 10E3/UL
IMM GRANULOCYTES NFR BLD: 0 %
IMM GRANULOCYTES NFR BLD: 0 %
KETONES UR STRIP-MCNC: NEGATIVE MG/DL
LEUKOCYTE ESTERASE UR QL STRIP: NEGATIVE
LYMPHOCYTES # BLD AUTO: 1.3 10E3/UL (ref 0.8–5.3)
LYMPHOCYTES # BLD AUTO: 1.5 10E3/UL (ref 0.8–5.3)
LYMPHOCYTES NFR BLD AUTO: 16 %
LYMPHOCYTES NFR BLD AUTO: 19 %
MCH RBC QN AUTO: 29.5 PG (ref 26.5–33)
MCH RBC QN AUTO: 30.1 PG (ref 26.5–33)
MCHC RBC AUTO-ENTMCNC: 32.2 G/DL (ref 31.5–36.5)
MCHC RBC AUTO-ENTMCNC: 33.5 G/DL (ref 31.5–36.5)
MCV RBC AUTO: 90 FL (ref 78–100)
MCV RBC AUTO: 92 FL (ref 78–100)
MONOCYTES # BLD AUTO: 0.4 10E3/UL (ref 0–1.3)
MONOCYTES # BLD AUTO: 0.5 10E3/UL (ref 0–1.3)
MONOCYTES NFR BLD AUTO: 5 %
MONOCYTES NFR BLD AUTO: 6 %
NEUTROPHILS # BLD AUTO: 6.1 10E3/UL (ref 1.6–8.3)
NEUTROPHILS # BLD AUTO: 6.2 10E3/UL (ref 1.6–8.3)
NEUTROPHILS NFR BLD AUTO: 75 %
NEUTROPHILS NFR BLD AUTO: 79 %
NITRATE UR QL: NEGATIVE
NRBC # BLD AUTO: 0 10E3/UL
NRBC BLD AUTO-RTO: 0 /100
PH UR STRIP: 6.5 [PH] (ref 5–7)
PLATELET # BLD AUTO: 210 10E3/UL (ref 150–450)
PLATELET # BLD AUTO: 213 10E3/UL (ref 150–450)
POTASSIUM BLD-SCNC: 4.8 MMOL/L (ref 3.4–5.3)
POTASSIUM SERPL-SCNC: 4.3 MMOL/L (ref 3.4–5.3)
RBC # BLD AUTO: 5.19 10E6/UL (ref 4.4–5.9)
RBC # BLD AUTO: 5.22 10E6/UL (ref 4.4–5.9)
SODIUM SERPL-SCNC: 137 MMOL/L (ref 135–145)
SODIUM SERPL-SCNC: 141 MMOL/L (ref 135–145)
SP GR UR STRIP: 1 (ref 1–1.03)
TROPONIN T SERPL HS-MCNC: 10 NG/L
TROPONIN T SERPL HS-MCNC: 9 NG/L
UROBILINOGEN UR STRIP-MCNC: NORMAL MG/DL
WBC # BLD AUTO: 7.8 10E3/UL (ref 4–11)
WBC # BLD AUTO: 8.2 10E3/UL (ref 4–11)

## 2024-01-29 PROCEDURE — 93005 ELECTROCARDIOGRAM TRACING: CPT

## 2024-01-29 PROCEDURE — 81003 URINALYSIS AUTO W/O SCOPE: CPT | Performed by: EMERGENCY MEDICINE

## 2024-01-29 PROCEDURE — 96360 HYDRATION IV INFUSION INIT: CPT | Mod: 59

## 2024-01-29 PROCEDURE — 250N000009 HC RX 250: Performed by: EMERGENCY MEDICINE

## 2024-01-29 PROCEDURE — 84484 ASSAY OF TROPONIN QUANT: CPT | Mod: 91 | Performed by: EMERGENCY MEDICINE

## 2024-01-29 PROCEDURE — 36415 COLL VENOUS BLD VENIPUNCTURE: CPT | Performed by: EMERGENCY MEDICINE

## 2024-01-29 PROCEDURE — 258N000003 HC RX IP 258 OP 636: Performed by: EMERGENCY MEDICINE

## 2024-01-29 PROCEDURE — 80048 BASIC METABOLIC PNL TOTAL CA: CPT | Performed by: EMERGENCY MEDICINE

## 2024-01-29 PROCEDURE — 85025 COMPLETE CBC W/AUTO DIFF WBC: CPT | Performed by: EMERGENCY MEDICINE

## 2024-01-29 PROCEDURE — 250N000013 HC RX MED GY IP 250 OP 250 PS 637: Performed by: EMERGENCY MEDICINE

## 2024-01-29 PROCEDURE — 250N000011 HC RX IP 250 OP 636: Performed by: EMERGENCY MEDICINE

## 2024-01-29 PROCEDURE — 83036 HEMOGLOBIN GLYCOSYLATED A1C: CPT | Performed by: FAMILY MEDICINE

## 2024-01-29 PROCEDURE — 74177 CT ABD & PELVIS W/CONTRAST: CPT

## 2024-01-29 PROCEDURE — 99215 OFFICE O/P EST HI 40 MIN: CPT | Mod: 25 | Performed by: FAMILY MEDICINE

## 2024-01-29 PROCEDURE — 99285 EMERGENCY DEPT VISIT HI MDM: CPT | Mod: 25

## 2024-01-29 PROCEDURE — 93242 EXT ECG>48HR<7D RECORDING: CPT | Performed by: FAMILY MEDICINE

## 2024-01-29 PROCEDURE — 80048 BASIC METABOLIC PNL TOTAL CA: CPT | Performed by: FAMILY MEDICINE

## 2024-01-29 PROCEDURE — 99417 PROLNG OP E/M EACH 15 MIN: CPT | Performed by: FAMILY MEDICINE

## 2024-01-29 PROCEDURE — 93000 ELECTROCARDIOGRAM COMPLETE: CPT | Mod: 59 | Performed by: FAMILY MEDICINE

## 2024-01-29 PROCEDURE — 70450 CT HEAD/BRAIN W/O DYE: CPT

## 2024-01-29 PROCEDURE — 36415 COLL VENOUS BLD VENIPUNCTURE: CPT | Performed by: FAMILY MEDICINE

## 2024-01-29 PROCEDURE — 84484 ASSAY OF TROPONIN QUANT: CPT | Performed by: EMERGENCY MEDICINE

## 2024-01-29 PROCEDURE — 85025 COMPLETE CBC W/AUTO DIFF WBC: CPT | Performed by: FAMILY MEDICINE

## 2024-01-29 RX ORDER — ACETAMINOPHEN 500 MG
1000 TABLET ORAL ONCE
Status: COMPLETED | OUTPATIENT
Start: 2024-01-29 | End: 2024-01-29

## 2024-01-29 RX ORDER — CYCLOBENZAPRINE HCL 10 MG
10 TABLET ORAL ONCE
Status: COMPLETED | OUTPATIENT
Start: 2024-01-29 | End: 2024-01-29

## 2024-01-29 RX ORDER — IOPAMIDOL 755 MG/ML
500 INJECTION, SOLUTION INTRAVASCULAR ONCE
Status: COMPLETED | OUTPATIENT
Start: 2024-01-29 | End: 2024-01-29

## 2024-01-29 RX ORDER — IBUPROFEN 600 MG/1
600 TABLET, FILM COATED ORAL ONCE
Status: COMPLETED | OUTPATIENT
Start: 2024-01-29 | End: 2024-01-29

## 2024-01-29 RX ADMIN — SODIUM CHLORIDE 1000 ML: 9 INJECTION, SOLUTION INTRAVENOUS at 19:32

## 2024-01-29 RX ADMIN — SODIUM CHLORIDE 64 ML: 9 INJECTION, SOLUTION INTRAVENOUS at 19:12

## 2024-01-29 RX ADMIN — IOPAMIDOL 95 ML: 755 INJECTION, SOLUTION INTRAVENOUS at 19:12

## 2024-01-29 RX ADMIN — ACETAMINOPHEN 1000 MG: 500 TABLET ORAL at 17:31

## 2024-01-29 RX ADMIN — IBUPROFEN 600 MG: 600 TABLET, FILM COATED ORAL at 18:36

## 2024-01-29 RX ADMIN — CYCLOBENZAPRINE 10 MG: 10 TABLET, FILM COATED ORAL at 17:31

## 2024-01-29 ASSESSMENT — ACTIVITIES OF DAILY LIVING (ADL)
ADLS_ACUITY_SCORE: 35
ADLS_ACUITY_SCORE: 35

## 2024-01-29 NOTE — ED PROVIDER NOTES
History     Chief Complaint:  Loss of Consciousness       The history is provided by the patient.      Brian Hooks is a 19 year old male who presents after loss of consciousness. Patient reports around 1030 this morning, he was getting a drink of water at school when he lost consciousness, hitting his head on the drinking fountain and falling. He was reportedly out for 25 seconds, and was picked up by bystanders when he lost consciousness a second time for 15 seconds. Prior to the episode, he remembers feeling dizzy and seeing spots. He adds he felt short of breath, but notes he was in a rush to get to class, so this may have been the cause of his shortness of breath. He notes he had not eaten breakfast this morning, but did eat at urgent care and here in the ED. Patient was seen at [facility] afterwards, where he had a workup for syncope, and ultimately discharged. He did feel weak and dizzy upon discharge. His aunt who is a surgeon urged him to present to the ED for further labs and a full body CT. Patient adds he has a severe headache with neck pain here in the ED, but is unsure when this onset. He states he has had chronic headaches since middle school, however this is worse than usual. He has never had imaging for this. He adds he has had 3 weeks of constipation, and was seen at urgent care 2 weeks ago for this. He has been drinking water, eating vegetables, and taking Miralax with no relief. Endorses some abdominal discomfort, but not pain, noting he feels distended. His last bowel movement was 2 days ago, and this was dark but not bloody.  Denies chest pain, palpitations, abdominal pain, back pain, urinary symptoms, altered mental status, or vomiting. Patient adds he does heavy weightlifting for exercises, but not much cardio. He does take supplements, but has not had any changes in this and denies taking stimulants. Denies family history of sudden cardiac death.    Independent Historian:   None  - Patient Only    Review of External Notes:   Reviewed urgent care note and EKG from earlier today.    Medications:    Creatine   Docusate sodium  Pantoprazole     Past Medical History:    Upper GI bleed  Anemia     Past Surgical History:    EGD     Physical Exam   Patient Vitals for the past 24 hrs:   BP Temp Temp src Pulse Resp SpO2   01/29/24 1815 127/87 -- -- 60 -- 100 %   01/29/24 1800 125/72 -- -- 77 -- 96 %   01/29/24 1745 124/74 -- -- 71 -- 95 %   01/29/24 1730 129/76 -- -- 69 -- 97 %   01/29/24 1728 -- -- -- -- -- 97 %   01/29/24 1727 139/79 -- -- 73 -- --   01/29/24 1700 107/69 -- -- 73 24 96 %   01/29/24 1630 104/70 -- -- 56 13 97 %   01/29/24 1615 118/65 -- -- 61 11 96 %   01/29/24 1607 122/80 -- -- 80 18 96 %   01/29/24 1512 139/84 97.3  F (36.3  C) Temporal 96 16 96 %      Physical Exam  General: Adult male sitting upright  Eyes: PERRL, Conjunctive within normal limits.  EOMI.  HENT: No palpable scalp hematoma or tenderness.  No hemotympanum.  Moist mucous membranes, oropharynx clear.   CV: Normal S1S2, no murmur, rub or gallop. Regular rate and rhythm  Resp: Clear to auscultation bilaterally, no wheezes, rales or rhonchi. Normal respiratory effort.  GI: Abdomen is soft, nontender and nondistended. No palpable masses. No rebound or guarding.  MSK: No edema. Nontender. Normal active range of motion.  Skin: Warm and dry. No rashes or lesions or ecchymoses on visible skin.  Neuro: Alert and oriented. Responds appropriately to all questions and commands. No focal findings appreciated. Normal muscle tone.  Psych: Normal mood and affect. Pleasant.     Emergency Department Course   ECG  ECG taken at 1522, ECG read at 1540  Normal sinus rhythm with sinus arrhythmia    Rate 73 bpm. NY interval 160 ms. QRS duration 92 ms. QT/QTc 384/423 ms. P-R-T axes 65 86 37.     Imaging:  CT Abdomen Pelvis w Contrast   Final Result   IMPRESSION:    1.  No acute finding is in the abdomen or pelvis.      CT Head w/o Contrast    Final Result   IMPRESSION:   1.  Normal head CT.         Laboratory:  Labs Ordered and Resulted from Time of ED Arrival to Time of ED Departure   BASIC METABOLIC PANEL - Abnormal       Result Value    Sodium 137      Potassium 4.3      Chloride 101      Carbon Dioxide (CO2) 27      Anion Gap 9      Urea Nitrogen 15.9      Creatinine 1.33 (*)     GFR Estimate 78      Calcium 9.5      Glucose 122 (*)    TROPONIN T, HIGH SENSITIVITY - Normal    Troponin T, High Sensitivity 10     TROPONIN T, HIGH SENSITIVITY - Normal    Troponin T, High Sensitivity 9     URINE MACROSCOPIC WITH REFLEX TO MICRO - Normal    Color Urine Straw      Appearance Urine Clear      Glucose Urine Negative      Bilirubin Urine Negative      Ketones Urine Negative      Specific Gravity Urine 1.003      Blood Urine Negative      pH Urine 6.5      Protein Albumin Urine Negative      Urobilinogen Urine Normal      Nitrite Urine Negative      Leukocyte Esterase Urine Negative     CBC WITH PLATELETS AND DIFFERENTIAL    WBC Count 8.2      RBC Count 5.19      Hemoglobin 15.6      Hematocrit 46.6      MCV 90      MCH 30.1      MCHC 33.5      RDW 12.8      Platelet Count 210      % Neutrophils 75      % Lymphocytes 19      % Monocytes 6      % Eosinophils 0      % Basophils 0      % Immature Granulocytes 0      NRBCs per 100 WBC 0      Absolute Neutrophils 6.2      Absolute Lymphocytes 1.5      Absolute Monocytes 0.5      Absolute Eosinophils 0.0      Absolute Basophils 0.0      Absolute Immature Granulocytes 0.0      Absolute NRBCs 0.0     GLUCOSE MONITOR NURSING POCT      Emergency Department Course & Assessments:  Interventions:  Medications   sodium chloride 0.9% BOLUS 1,000 mL (1,000 mLs Intravenous $New Bag 1/29/24 1932)   cyclobenzaprine (FLEXERIL) tablet 10 mg (10 mg Oral $Given 1/29/24 1731)   acetaminophen (TYLENOL) tablet 1,000 mg (1,000 mg Oral $Given 1/29/24 1731)   ibuprofen (ADVIL/MOTRIN) tablet 600 mg (600 mg Oral $Given 1/29/24 1836)    iopamidol (ISOVUE-370) solution 500 mL (95 mLs Intravenous $Given 1/29/24 1912)   CT Scan Flush (64 mLs Intravenous $Given 1/29/24 1912)        Independent Interpretation (X-rays, CTs, rhythm strip):  I reviewed the patient's head CT and abdominal CT.  Do not see evidence of intracranial hemorrhage, bowel obstruction, or bowel perforation.  Nonspecific gas in bowel pattern appreciated.    Assessments/Consultations/Discussion of Management or Tests:   ED Course as of 01/29/24 1954 Mon Jan 29, 2024   1608 I evaluated the patient and obtained history as noted above.   I reassessed the patient.  No changes.  His mother voices concerns for unclear cause of his change in bowel habits and the syncope and whether they are related.  This seems unlikely.  She would like a CT.  I discussed that I would not recommend a CT abdomen emergently for his current presentation but both patient and mother are adamant about that despite the risks including radiation and creased risk of cancer in later years.  I reassessed the patient.  Discussed findings of CT abdomen pelvis.  He continues to be stable and voices no new concerns.  Outpatient recommendations discussed.  Return precautions discussed.    Social Determinants of Health affecting care:   None    Disposition:  The patient was discharged.     Impression & Plan    Medical Decision Making:  Brian Hooks is a 19 year old male who presents with a history and clinical exam consistent with syncope.  While orthostasis or vasovagal syncope was the most likely etiology given the history of this patient, I considered a broad differential for their syncope today including cardiac arrythmia, ACS, aortic stenosis, HOCM, PE, orthostatic hypotension, drugs, situational, carotid hypersensitivity, seizure, TIA, stroke, vasovagal.   He has no signs of a concerning etiology for syncope at this point.  In addition,he has no family history of sudden death, no chest pain, no seizure activity  or post-ictal period, no murmur, and no signs of orthostasis in the ED, no focal neurologic symptoms.  He did have what he was describing as a severe headache and given the presentation which included trauma to his head, head CT was obtained but fortunately was normal.  He was also reporting stool changes over the last weeks.  He had a benign abdominal examination.  CT was obtained at the request of family members and the patient although was not recommended emergently and did not demonstrate any acute pathology.  He does use energy drinks and supplements and this could contribute to his symptoms as well as slight bump in creatinine, recommended discussing what he is taking with the primary care provider with reassessment within the coming 3 days.  Recommend avoiding overexertion, drinking plenty fluids, and returning if there is recurrence of symptoms or any new symptoms develop.  He and his mother at this time reassured and feel comfortable with the plan.        Diagnosis:    ICD-10-CM    1. Syncope, unspecified syncope type  R55       2. Closed head injury, initial encounter  S09.90XA       3. Abdominal discomfort  R10.9       4. Change in stool habits  R19.4       5. Elevated serum creatinine  R79.89             Scribe Disclosure:  I, Mindanga Adorno, am serving as a scribe at 5:04 PM on 1/29/2024 to document services personally performed by Pearl Titus MD based on my observations and the provider's statements to me.    1/29/2024   Pearl Titus MD Jonkman, Tracy Dianne, MD  01/29/24 [id number]

## 2024-01-29 NOTE — PROGRESS NOTES
Assessment & Plan     Dizziness  - EKG 12-lead complete w/read - Clinics  - CBC with platelets and differential  - Basic metabolic panel  (Ca, Cl, CO2, Creat, Gluc, K, Na, BUN)  - CBC with platelets and differential  - Basic metabolic panel  (Ca, Cl, CO2, Creat, Gluc, K, Na, BUN)    Hyperglycemia  - Hemoglobin A1c  - Hemoglobin A1c    Syncope, unspecified syncope type  - ZIO PATCH 3-7 DAYS (additional cost to patient)  - ZIO PATCH 3-7 DAYS APPLICATION     Orthostatic vital signs - normal.   Checked A1c, this returned normal ( hyperglycemia and reported fasting but recalls later he was given sugary drinks)  Prior hx of anemia, his hemoglobin today is in the normal range. Clinically he does not appear dehydrated.   Hx suggestive of a vasovagal episode ( similar to fainting episode last summer)  but cannot effectively rule out an underlying cardiac condition, holter monitor ordered with instructions to schedule outpatient.     We discussed briefly that with his mild head injury that it is certainly possible that later develops concussion symptoms, if concerned return to be evaluated. At this time has normal mentation and able to stand for me/walk while holding all his items without reoccurrence of symptoms. Patient given instructions which he acknowledges along with written direction to go to ED if symptoms worsen or reoccur.     Observed in clinic for 2 hours, he had his full lunch after his blood work and tolerated without nausea.     Patient does not report notable head pain or neck discomfort, examination unremarkable for suggestion of intracranial bleed or cervical spine fracture.     His mom picked him and will monitor him.     75 minutes spent on the date of the encounter doing chart review, history and exam, documentation and further activities per the note.       Kiet Fernandez MD   [facility]    Lencho Torres is a 19 year old male who presents to clinic today for the following health  issues:  Chief Complaint   Patient presents with    Dizziness     Today, fainted at school twice-hit head on drinking fountain, lightheaded, weakness     HPI    Patient presents today with an episode of fainting while he was  at school today his classmate brought him here to the urgent care. Was going for a drink of water then passed out and hit right  sidehead on the drinking fountain, he was reportedly out for 20-25 seconds. When standing up he was still lightheaded and had brief fainting episode without losing consciousness. Was brought here directly from school.     He denies any body aches, fever or difficulty with breathing.  No known exposures to illnesses.  He works out regularly primarily doing lifting of his chest and his arms.  No family history of diabetes.  No FH of early cardiac disease.   Reports no intense cardio activity prior to onset of symptoms.     No prior hx of head injuries/concussion    Last summer he did have a fainting episode after being outside for a period of time, this led to discovery of anemia which was believed to be due to overuse of NSAIDs. He is no longer on iron supplements.     He mentioned later that he did get sunkist and gatorade after passing out    No prior hx of POTS    He is not established with a PCP.   Patient Active Problem List    Diagnosis Date Noted    Upper GI bleed 07/20/2023     Priority: Medium    Symptomatic anemia 07/20/2023     Priority: Medium    Excessive use of nonsteroidal anti-inflammatory drugs (NSAIDs) 07/20/2023     Priority: Medium     Current Outpatient Medications   Medication    CREATINE PO    docusate sodium (COLACE) 100 MG capsule    ferrous sulfate (FEROSUL) 325 (65 Fe) MG tablet    pantoprazole (PROTONIX) 40 MG EC tablet    vitamin C (ASCORBIC ACID) 250 MG tablet     No current facility-administered medications for this visit.         Objective    BP (!) 146/87   Pulse 97   Temp 98  F (36.7  C)   Resp 20   SpO2 97%   Physical Exam        CV: RRR no m/r/g  Pulm: clear bilaterally  GEN: normal mentation, good skin tone.   Head: mahan's/raccoon's negative, no open wounds/lacerations at area of head injury  Throat: no enlarged tonsils  Neck: normal head movement without pain  Eyes: PERRL  Neuro: no ataxia, normal mentation, follows commands appropriately    Results for orders placed or performed in visit on 01/29/24   Basic metabolic panel  (Ca, Cl, CO2, Creat, Gluc, K, Na, BUN)     Status: Abnormal   Result Value Ref Range    Sodium 141 135 - 145 mmol/L    Potassium 4.8 3.4 - 5.3 mmol/L    Chloride 103 94 - 109 mmol/L    Carbon Dioxide (CO2) 29 20 - 32 mmol/L    Anion Gap 9 3 - 14 mmol/L    Urea Nitrogen 17 7 - 30 mg/dL    Creatinine 1.30 (H) 0.66 - 1.25 mg/dL    GFR Estimate 80 >60 mL/min/1.73m2    Calcium 10.0 8.5 - 10.1 mg/dL    Glucose 190 (H) 70 - 99 mg/dL   CBC with platelets and differential     Status: None   Result Value Ref Range    WBC Count 7.8 4.0 - 11.0 10e3/uL    RBC Count 5.22 4.40 - 5.90 10e6/uL    Hemoglobin 15.4 13.3 - 17.7 g/dL    Hematocrit 47.9 40.0 - 53.0 %    MCV 92 78 - 100 fL    MCH 29.5 26.5 - 33.0 pg    MCHC 32.2 31.5 - 36.5 g/dL    RDW 12.8 10.0 - 15.0 %    Platelet Count 213 150 - 450 10e3/uL    % Neutrophils 79 %    % Lymphocytes 16 %    % Monocytes 5 %    % Eosinophils 0 %    % Basophils 0 %    % Immature Granulocytes 0 %    Absolute Neutrophils 6.1 1.6 - 8.3 10e3/uL    Absolute Lymphocytes 1.3 0.8 - 5.3 10e3/uL    Absolute Monocytes 0.4 0.0 - 1.3 10e3/uL    Absolute Eosinophils 0.0 0.0 - 0.7 10e3/uL    Absolute Basophils 0.0 0.0 - 0.2 10e3/uL    Absolute Immature Granulocytes 0.0 <=0.4 10e3/uL   Hemoglobin A1c     Status: Normal   Result Value Ref Range    Hemoglobin A1C 5.0 0.0 - 5.6 %   CBC with platelets and differential     Status: None    Narrative    The following orders were created for panel order CBC with platelets and differential.  Procedure                               Abnormality         Status                      ---------                               -----------         ------                     CBC with platelets and d...[[id number]]                      Final result                 Please view results for these tests on the individual orders.   EKG ; sinus, rate 74, Qtc 390        The use of Dragon/PowerMic dictation services may have been used to construct the content in this note; any grammatical or spelling errors are non-intentional. Please contact the author of this note directly if you are in need of any clarification.

## 2024-01-29 NOTE — PATIENT INSTRUCTIONS
Drink  ounces of water/fluids a day    Take at least 1 minute when getting up to adjust before walking    If you have another fainting episode please go to the emergency room    If new symptoms develop please return to be evaluated    1) Set up primary care appointment to be seen in the next week    2) set up appointment for zio patch/heart monitor testing  [phone]

## 2024-01-29 NOTE — ED TRIAGE NOTES
Pt states he was at drinking fountain when felt SOB and dizzy, hitting side of head on fountain. No nausea, vomiting, CP. Pt seen at  with negative work-up. Pt referred here by his aunt his is MD and suggested further work up.Pt eating and drinking in triage, encouraged to wait until seen by ED MD. ABC intact.

## 2024-01-30 LAB
ATRIAL RATE - MUSE: 73 BPM
DIASTOLIC BLOOD PRESSURE - MUSE: NORMAL MMHG
INTERPRETATION ECG - MUSE: NORMAL
P AXIS - MUSE: 65 DEGREES
PR INTERVAL - MUSE: 160 MS
QRS DURATION - MUSE: 92 MS
QT - MUSE: 384 MS
QTC - MUSE: 423 MS
R AXIS - MUSE: 86 DEGREES
SYSTOLIC BLOOD PRESSURE - MUSE: NORMAL MMHG
T AXIS - MUSE: 37 DEGREES
VENTRICULAR RATE- MUSE: 73 BPM

## 2024-01-30 NOTE — DISCHARGE INSTRUCTIONS
Discharge Instructions  Syncope    Syncope (fainting) is a sudden, short loss of consciousness (passing out spell). People will usually fall to the ground when they faint or slump over if seated.  People may also shake when this happens, and it can sometimes be difficult to tell the difference between syncope and a seizure. At this time, your provider does not find a reason to suspect that your fainting spell is a sign of anything dangerous or life-threatening.  However, sometimes the signs of serious illness do not show up right away.     Generally, every Emergency Department visit should have a follow-up clinic visit with either a primary or a specialty clinic/provider. Please follow-up as instructed by your emergency provider today.    Return to the Emergency Department if:  You faint again.   You have any significant bleeding.  You have chest pain or a fast or irregular heartbeat.  You feel short of breath.  You cough up any blood.  You have abdominal (belly) pain or unusual back pain.  You have ongoing vomiting (throwing up) or diarrhea (loose stools).  You have a black or tarry bowel movement, or blood in the stool or in your vomit.  You have a fever over 101 F.  You lose feeling or cannot move a part of your body or cannot talk normally.  You are confused, have a headache, cannot see well, or have a seizure.  DO NOT DRIVE. CALL 911 INSTEAD!    What can I do to help myself?  Follow any specific instructions that your provider discussed with you.  If you feel light-headed, make sure to sit down right away, even if you have to sit on the floor.  Follow up with your regular medical provider as discussed for further management. This may include lowering your blood pressure medications, insulin or other diabetic medications, checking your blood sugar more frequently, and drinking more fluids, taking medicines for vomiting or diarrhea or getting up slower.  If you were given a prescription for medicine here today,  be sure to read all of the information (including the package insert) that comes with your prescription.  This will include important information about the medicine, its side effects, and any warnings that you need to know about.  The pharmacist who fills the prescription can provide more information and answer questions you may have about the medicine.  If you have questions or concerns that the pharmacist cannot address, please call or return to the Emergency Department.   Remember that you can always come back to the Emergency Department if you are not able to see your regular provider in the amount of time listed above, if you get any new symptoms, or if there is anything that worries you.     Discharge Instructions  Head Injury    You have been seen today for a head injury. Your evaluation included a history and physical examination. You may have had a CT (CAT) scan performed, though most head injuries do not require a scan. Based on this evaluation, your provider today does not feel that your head injury is serious.    Generally, every Emergency Department visit should have a follow-up clinic visit with either a primary or a specialty clinic/provider. Please follow-up as instructed by your emergency provider today.  Return to the Emergency Department if:  You are confused or you are not acting right.  Your headache gets worse or you start to have a really bad headache even with your recommended treatment plan.  You vomit (throw up) more than once.  You have a seizure.  You have trouble walking.  You have weakness or paralysis (cannot move) in an arm or a leg.  You have blood or fluid coming from your ears or nose.  You have new symptoms or anything that worries you.    Sleeping:  It is okay for you to sleep, but someone should wake you up if instructed by your provider, and someone should check on you at your usual time to wake up.     Activity:  Do not drive for at least 24 hours.  Do not drive if you have  dizzy spells or trouble concentrating, or remembering things.  Do not return to any contact sports until cleared by your regular provider.     MORE INFORMATION:    Concussion:  A concussion is a minor head injury that may cause temporary problems with the way the brain works. Although concussions are important, they are generally not an emergency or a reason that a person needs to be hospitalized. Some concussion symptoms include confusion, amnesia (forgetful), nausea (sick to your stomach) and vomiting (throwing up), dizziness, fatigue, memory or concentration problems, irritability and sleep problems. For most people, concussions are mild and temporary but some will have more severe and persistent symptoms that require on-going care and treatment.  CT Scans: Your evaluation today may have included a CT scan (CAT scan) to look for things like bleeding or a skull fracture (broken bone).  CT scans involve radiation and too many CT scans can cause serious health problems like cancer, especially in children.  Because of this, your provider may not have ordered a CT scan today if they think you are at low risk for a serious or life threatening problem.    If you were given a prescription for medicine here today, be sure to read all of the information (including the package insert) that comes with your prescription.  This will include important information about the medicine, its side effects, and any warnings that you need to know about.  The pharmacist who fills the prescription can provide more information and answer questions you may have about the medicine.  If you have questions or concerns that the pharmacist cannot address, please call or return to the Emergency Department.     Remember that you can always come back to the Emergency Department if you are not able to see your regular provider in the amount of time listed above, if you get any new symptoms, or if there is anything that worries you.     Discharge  Instructions  Constipation  Constipation can cause severe cramping pain and your provider thinks this might be the cause of your abdominal pain (belly pain) today.  People usually recognize that they are constipated because they have difficulty having bowel movements, are not having bowel movements frequently enough, or are not having large enough bowel movements. Sometimes, especially in children or older people, you do not recognize that you are constipated until it becomes severe. The most common causes of constipation are a lack of exercise and not eating enough fruits, vegetables, and whole grains. Constipation can also be a side effect of medications, such as narcotics, or may be caused by a disease of the digestive system.    Generally, every Emergency Department visit should have a follow-up clinic visit with either a primary or a specialty clinic/provider. Please follow-up as instructed by your emergency provider today. Sometimes, chronic constipation requires further testing to determine the cause. If you are over 50 years old, you may need a colonoscopy if you have not had one before.     Return to the Emergency Department if:  Your abdominal pain worsens or does not improve after a bowel movement.  You become very weak.  You get a temperature above 102oF or as directed by your provider.  You have blood in your stools (bright red or black, tarry stools).  You keep vomiting (throwing up) or cannot drink liquids.  Your see blood when you vomit.  Your stomach gets bloated or bigger.  You have new symptoms or anything that worries you.    What can I do to help myself?  If your provider gave you a cathartic medication, like magnesium citrate or GoLytely  (polyethylene glycol), you can expect to have cramps and gas pains after taking it. You can expect to have a number of bowel movements and even diarrhea (loose or watery stools) in the course of clearing your bowels.  You will know your bowels have been  cleaned out after you pass clear liquid. The cramps and gas should let up after you have emptied your bowels. You may want to wait until morning to take this type of medication so you aren t up in the night.   Sometimes instead of cathartics, we recommend laxatives like milk of magnesia to move your bowels more slowly, or an enema to help the bowels to move. Read and follow the package directions, or follow your provider s instructions.  Once you have become very constipated, it takes time for your bowels to return to normal and you need to be very careful to prevent becoming constipated again. Take a laxative if you do not move your bowels at least every two days.     Eat foods that have a lot of fiber. Good choices are fruits, vegetables, prune juice, apple juice, and high fiber cereal. Limit dairy products such as milk and cheese, since these can make constipation worse.   Drink plenty of water.   When you feel the need to go to the bathroom, go to the bathroom. Do not hold it.  Miralax , Metamucil , Colace , Senna or fiber supplements can be used daily.  Miralax  daily is often the best choice for children.  If you were given a prescription for medicine here today, be sure to read all of the information (including the package insert) that comes with your prescription.  This will include important information about the medicine, its side effects, and any warnings that you need to know about.  The pharmacist who fills the prescription can provide more information and answer questions you may have about the medicine.  If you have questions or concerns that the pharmacist cannot address, please call or return to the Emergency Department.   Remember that you can always come back to the Emergency Department if you are not able to see your regular provider in the amount of time listed above, if you get any new symptoms, or if there is anything that worries you.     Discuss elevated creatinine with your primary care  provider.

## 2024-02-05 ENCOUNTER — OFFICE VISIT (OUTPATIENT)
Dept: FAMILY MEDICINE | Facility: CLINIC | Age: 19
End: 2024-02-05
Payer: COMMERCIAL

## 2024-02-05 ENCOUNTER — ORDERS ONLY (AUTO-RELEASED) (OUTPATIENT)
Dept: FAMILY MEDICINE | Facility: CLINIC | Age: 19
End: 2024-02-05

## 2024-02-05 VITALS
HEIGHT: 71 IN | DIASTOLIC BLOOD PRESSURE: 71 MMHG | RESPIRATION RATE: 16 BRPM | BODY MASS INDEX: 26.6 KG/M2 | SYSTOLIC BLOOD PRESSURE: 111 MMHG | HEART RATE: 65 BPM | WEIGHT: 190 LBS | OXYGEN SATURATION: 97 % | TEMPERATURE: 97.7 F

## 2024-02-05 DIAGNOSIS — K59.09 OTHER CONSTIPATION: ICD-10-CM

## 2024-02-05 DIAGNOSIS — Z87.898 HISTORY OF SYNCOPE: ICD-10-CM

## 2024-02-05 DIAGNOSIS — R79.89 ELEVATED SERUM CREATININE: ICD-10-CM

## 2024-02-05 DIAGNOSIS — Z87.19 HISTORY OF GI BLEED: ICD-10-CM

## 2024-02-05 DIAGNOSIS — Z13.21 ENCOUNTER FOR VITAMIN DEFICIENCY SCREENING: ICD-10-CM

## 2024-02-05 DIAGNOSIS — R79.89 ABNORMAL TSH: ICD-10-CM

## 2024-02-05 DIAGNOSIS — Z87.898 HISTORY OF SYNCOPE: Primary | ICD-10-CM

## 2024-02-05 DIAGNOSIS — Z86.2 HISTORY OF ANEMIA: ICD-10-CM

## 2024-02-05 PROCEDURE — 86376 MICROSOMAL ANTIBODY EACH: CPT | Performed by: INTERNAL MEDICINE

## 2024-02-05 PROCEDURE — 82306 VITAMIN D 25 HYDROXY: CPT | Performed by: INTERNAL MEDICINE

## 2024-02-05 PROCEDURE — 36415 COLL VENOUS BLD VENIPUNCTURE: CPT | Performed by: INTERNAL MEDICINE

## 2024-02-05 PROCEDURE — 82728 ASSAY OF FERRITIN: CPT | Performed by: INTERNAL MEDICINE

## 2024-02-05 PROCEDURE — 84443 ASSAY THYROID STIM HORMONE: CPT | Performed by: INTERNAL MEDICINE

## 2024-02-05 PROCEDURE — 82610 CYSTATIN C: CPT | Performed by: INTERNAL MEDICINE

## 2024-02-05 PROCEDURE — 99214 OFFICE O/P EST MOD 30 MIN: CPT | Performed by: INTERNAL MEDICINE

## 2024-02-05 ASSESSMENT — PAIN SCALES - GENERAL: PAINLEVEL: NO PAIN (0)

## 2024-02-05 NOTE — PROGRESS NOTES
Assessment & Plan   Problem List Items Addressed This Visit    None  Visit Diagnoses       History of syncope    -  Primary    Relevant Orders    Echocardiogram Complete    ZIO PATCH MAIL OUT    Other constipation        Relevant Orders    Adult GI  Referral - Consult Only    Abnormal TSH        Relevant Orders    TSH with free T4 reflex    Thyroid peroxidase antibody    History of anemia        Relevant Orders    Ferritin    Encounter for vitamin deficiency screening        Relevant Orders    Vitamin D Deficiency    Elevated serum creatinine        Relevant Orders    Cystatin C with GFR    History of GI bleed            His exam was normal, he did sustain a concussion from the fall.  Denies any symptoms related to such.  Denies any dizziness.  He does have some slight frontal headache.  Denies any sinus congestion symptoms.  Denies any fever chills and denies any neck pain.  Continue closely monitor as outpatient.  No neurologic deficit or balance issues.  He is back to the gym.  Avoid any excessive weight lifting during gym exercises.  He denies any dizziness or chest symptoms during Valsalva or with exertional weightlifting.  Will check a baseline echo to ensure there is no septal hypertrophy/HCM.  There is no family history of sudden cardiac death.  Reviewed EKG QT is normal.  Will check a 3-day Holter monitor.  Reviewed all lab results.  His creatinine slightly elevated but he does use protein supplements at the gym, advised to cut down on such.  Keep well-hydrated.  Advised on maneuvers to alleviate or prevent presyncopal episode from progressing to syncope, he probably had a vasovagal/neurocardiogenic depressive syncopal episode.  Will refer to the GI specialist for history of chronic constipation, discussed method of treatment including use of laxatives increase fiber, keep well-hydrated.  He does not have any history of recurrent GI bleeding, melena hematochezia and his hemoglobin was stable.   "Further recommendation pending lab results.  His TSH was slightly abnormal we will repeat with TPO possible occult hypothyroidism contributing to his constipation, if persistent elevated TSH we will treat accordingly.  All questions answered.     MED REC REQUIRED  Post Medication Reconciliation Status: discharge medications reconciled, continue medications without change  BMI  Estimated body mass index is 26.5 kg/m  as calculated from the following:    Height as of this encounter: 1.803 m (5' 11\").    Weight as of this encounter: 86.2 kg (190 lb).   Weight management plan: Discussed healthy diet and exercise guidelines    See Patient Instructions      Lencho Torres is a 19 year old, presenting for the following health issues:  No chief complaint on file.         No data to display              HPI     ED/UC Followup:    Facility:  [facility] Emergency Dept  Date of visit: 01/29/2024  Reason for visit:     1. Syncope, unspecified syncope type  R55         2. Closed head injury, initial encounter  S09.90XA         3. Abdominal discomfort  R10.9         4. Change in stool habits  R19.4         5. Elevated serum creatinine  R79.89        Current Status: Stable.  Patient presenting as follow-up from the ER.  He was seen for syncopal episode and head concussion/head trauma.  Reviewed ER notes.  Patient denies any recurrent syncope or presyncope or dizziness or lightheadedness.  He is back to sports/gym he does weightlifting more of weights strengthening exercise.  Denies any dizziness during exertion or with Valsalva.  Denies any chest pain palpitations.  Denies any vomiting he has slight frontal headache, he has chronic headaches since childhood.  Denies any neck pain per se.  He has history of chronic constipation, he has used MiraLAX which resulted in diarrhea from MiraLAX and that occurred weeks with prior to his syncopal episode.  No family history of sudden cardiac deaths.  6 months ago he was " severely anemic due to GI bleed, EGD was nonlocalizing.  He was using nonsteroidals anti-inflammatory which he had stopped.      Review of Systems  Constitutional, neuro, ENT, endocrine, pulmonary, cardiac, gastrointestinal, genitourinary, musculoskeletal, integument and psychiatric systems are negative, except as otherwise noted.      Objective    There were no vitals taken for this visit.  There is no height or weight on file to calculate BMI.  Physical Exam   GENERAL: alert and no distress  EYES: Eyes grossly normal to inspection, PERRL and conjunctivae and sclerae normal  HENT: ear canals and TM's normal, nose and mouth without ulcers or lesions  NECK: no adenopathy, no asymmetry, masses, or scars  RESP: lungs clear to auscultation - no rales, rhonchi or wheezes  CV: regular rate and rhythm, normal S1 S2, no S3 or S4, no murmur, click or rub, no peripheral edema  ABDOMEN: soft, nontender, no hepatosplenomegaly, no masses and bowel sounds normal  MS: no gross musculoskeletal defects noted, no edema  SKIN: no suspicious lesions or rashes  NEURO: Normal strength and tone, mentation intact and speech normal  PSYCH: mentation appears normal, affect normal/bright    Admission on 01/29/2024, Discharged on 01/29/2024   Component Date Value Ref Range Status    Sodium 01/29/2024 137  135 - 145 mmol/L Final    Reference intervals for this test were updated on 09/26/2023 to more accurately reflect our healthy population. There may be differences in the flagging of prior results with similar values performed with this method. Interpretation of those prior results can be made in the context of the updated reference intervals.     Potassium 01/29/2024 4.3  3.4 - 5.3 mmol/L Final    Chloride 01/29/2024 101  98 - 107 mmol/L Final    Carbon Dioxide (CO2) 01/29/2024 27  22 - 29 mmol/L Final    Anion Gap 01/29/2024 9  7 - 15 mmol/L Final    Urea Nitrogen 01/29/2024 15.9  6.0 - 20.0 mg/dL Final    Creatinine 01/29/2024 1.33 (H)   0.67 - 1.17 mg/dL Final    GFR Estimate 01/29/2024 78  >60 mL/min/1.73m2 Final    Calcium 01/29/2024 9.5  8.6 - 10.0 mg/dL Final    Glucose 01/29/2024 122 (H)  70 - 99 mg/dL Final    Troponin T, High Sensitivity 01/29/2024 10  <=22 ng/L Final    Either a High Sensitivity Troponin T baseline (0 hours) value = 100 ng/L, or an increase in High Sensitivity Troponin T = 7 ng/L at 2 hours compared to 0 hours (2-0 hours), suggests myocardial injury, and urgent clinical attention is required.    If the 2-0 hours increase is <7 ng/L, a High Sensitivity Troponin T result above gender-specific reference ranges warrants further evaluation.   Recommendations for further evaluation include correlation with clinical decision-making tool (e.g., HEART), a 3rd High Sensitivity Troponin T test 2 hours after the 2nd (a 20% change from baseline would represent concern), admission for observation, close PCC/cardiology follow-up, or urgent outpatient provocative testing.    Ventricular Rate 01/29/2024 73  BPM Final    Atrial Rate 01/29/2024 73  BPM Final    OR Interval 01/29/2024 160  ms Final    QRS Duration 01/29/2024 92  ms Final    QT 01/29/2024 384  ms Final    QTc 01/29/2024 423  ms Final    P Axis 01/29/2024 65  degrees Final    R AXIS 01/29/2024 86  degrees Final    T Axis 01/29/2024 37  degrees Final    Interpretation ECG 01/29/2024    Final                    Value:Sinus rhythm with sinus arrhythmia  Normal ECG  When compared with ECG of 20-JUL-2023 16:15,  No significant change was found  Confirmed by - EMERGENCY ROOM, PHYSICIAN ([id number]),  PRESTON VILLEGAS ([id number]) on 1/30/2024 6:41:51 AM      WBC Count 01/29/2024 8.2  4.0 - 11.0 10e3/uL Final    RBC Count 01/29/2024 5.19  4.40 - 5.90 10e6/uL Final    Hemoglobin 01/29/2024 15.6  13.3 - 17.7 g/dL Final    Hematocrit 01/29/2024 46.6  40.0 - 53.0 % Final    MCV 01/29/2024 90  78 - 100 fL Final    MCH 01/29/2024 30.1  26.5 - 33.0 pg Final    MCHC 01/29/2024 33.5  31.5 - 36.5  g/dL Final    RDW 01/29/2024 12.8  10.0 - 15.0 % Final    Platelet Count 01/29/2024 210  150 - 450 10e3/uL Final    % Neutrophils 01/29/2024 75  % Final    % Lymphocytes 01/29/2024 19  % Final    % Monocytes 01/29/2024 6  % Final    % Eosinophils 01/29/2024 0  % Final    % Basophils 01/29/2024 0  % Final    % Immature Granulocytes 01/29/2024 0  % Final    NRBCs per 100 WBC 01/29/2024 0  <1 /100 Final    Absolute Neutrophils 01/29/2024 6.2  1.6 - 8.3 10e3/uL Final    Absolute Lymphocytes 01/29/2024 1.5  0.8 - 5.3 10e3/uL Final    Absolute Monocytes 01/29/2024 0.5  0.0 - 1.3 10e3/uL Final    Absolute Eosinophils 01/29/2024 0.0  0.0 - 0.7 10e3/uL Final    Absolute Basophils 01/29/2024 0.0  0.0 - 0.2 10e3/uL Final    Absolute Immature Granulocytes 01/29/2024 0.0  <=0.4 10e3/uL Final    Absolute NRBCs 01/29/2024 0.0  10e3/uL Final    Troponin T, High Sensitivity 01/29/2024 9  <=22 ng/L Final    Either a High Sensitivity Troponin T baseline (0 hours) value = 100 ng/L, or an increase in High Sensitivity Troponin T = 7 ng/L at 2 hours compared to 0 hours (2-0 hours), suggests myocardial injury, and urgent clinical attention is required.    If the 2-0 hours increase is <7 ng/L, a High Sensitivity Troponin T result above gender-specific reference ranges warrants further evaluation.   Recommendations for further evaluation include correlation with clinical decision-making tool (e.g., HEART), a 3rd High Sensitivity Troponin T test 2 hours after the 2nd (a 20% change from baseline would represent concern), admission for observation, close PCC/cardiology follow-up, or urgent outpatient provocative testing.    Color Urine 01/29/2024 Straw  Colorless, Straw, Light Yellow, Yellow Final    Appearance Urine 01/29/2024 Clear  Clear Final    Glucose Urine 01/29/2024 Negative  Negative mg/dL Final    Bilirubin Urine 01/29/2024 Negative  Negative Final    Ketones Urine 01/29/2024 Negative  Negative mg/dL Final    Specific Gravity Urine  01/29/2024 1.003  1.003 - 1.035 Final    Blood Urine 01/29/2024 Negative  Negative Final    pH Urine 01/29/2024 6.5  5.0 - 7.0 Final    Protein Albumin Urine 01/29/2024 Negative  Negative mg/dL Final    Urobilinogen Urine 01/29/2024 Normal  Normal, 2.0 mg/dL Final    Nitrite Urine 01/29/2024 Negative  Negative Final    Leukocyte Esterase Urine 01/29/2024 Negative  Negative Final           Signed Electronically by: Gladis Rajan MD

## 2024-02-06 LAB
CYSTATIN C (ROCHE): 0.9 MG/L (ref 0.6–1)
FERRITIN SERPL-MCNC: 43 NG/ML (ref 31–409)
GFR SERPL CREATININE-BSD FRML MDRD: >90 ML/MIN/1.73M2
TSH SERPL DL<=0.005 MIU/L-ACNC: 1.07 UIU/ML (ref 0.3–4.2)
VIT D+METAB SERPL-MCNC: 10 NG/ML (ref 20–50)

## 2024-02-07 DIAGNOSIS — R79.0 LOW IRON STORES: Primary | ICD-10-CM

## 2024-02-07 DIAGNOSIS — E55.9 VITAMIN D DEFICIENCY: ICD-10-CM

## 2024-02-07 LAB — THYROPEROXIDASE AB SERPL-ACNC: 26 IU/ML

## 2024-05-06 NOTE — TELEPHONE ENCOUNTER
REFERRAL INFORMATION:  Referring Provider:  Gladis Rajan MD  Referring Clinic:  [facility]  Reason for Visit/Diagnosis: Other constipation      FUTURE VISIT INFORMATION:  Appointment Date: 5/21/24  Appointment Time: 3:00     NOTES STATUS DETAILS   OFFICE NOTE from Referring Provider Internal OV-2/5/24 Gladis Rajan MD   OFFICE NOTE from Other Specialist Internal OV 1/11/24-Magdiel Arrieta MD   HOSPITAL DISCHARGE SUMMARY/  ED VISITS Internal ED to Admit 7/20/23-7/21/23-Geovany Mustafa MD   MEDICATION LIST Internal         ENDOSCOPY  Internal 7/20/23-Joseph Zuñiga MD   PERTINENT LABS Internal    IMAGING (CT, MRI, EGD, MRCP, Small Bowel Follow Through/SBT, MR/CT Enterography) Internal CT abdomen pelvis 1/29/24    XR abdomen 1/11/24

## 2024-05-21 ENCOUNTER — PRE VISIT (OUTPATIENT)
Dept: GASTROENTEROLOGY | Facility: CLINIC | Age: 19
End: 2024-05-21

## 2024-06-11 ENCOUNTER — TELEPHONE (OUTPATIENT)
Dept: CARDIOLOGY | Facility: CLINIC | Age: 19
End: 2024-06-11
Payer: COMMERCIAL

## 2024-06-11 NOTE — TELEPHONE ENCOUNTER
Please be advised that we have attempted to reach this patient to schedule Cardiac Testing however two attempts have been made to contact patient and they have not returned our call.  No further attempts to reach this patient will by made by scheduling team.  Please contact this patient to encourage them to call our office at [phone] and schedule this order if it is still clinically recommended.      Thank you for allowing [facility] to participate in this patients healthcare needs.

## 2024-09-05 ENCOUNTER — OFFICE VISIT (OUTPATIENT)
Dept: PEDIATRICS | Facility: CLINIC | Age: 19
End: 2024-09-05
Payer: COMMERCIAL

## 2024-09-05 VITALS
OXYGEN SATURATION: 99 % | TEMPERATURE: 97.4 F | SYSTOLIC BLOOD PRESSURE: 143 MMHG | BODY MASS INDEX: 26.12 KG/M2 | WEIGHT: 187.3 LBS | DIASTOLIC BLOOD PRESSURE: 79 MMHG | HEART RATE: 53 BPM

## 2024-09-05 DIAGNOSIS — K59.04 CHRONIC IDIOPATHIC CONSTIPATION: Primary | ICD-10-CM

## 2024-09-05 DIAGNOSIS — K92.1 HEMATOCHEZIA: ICD-10-CM

## 2024-09-05 LAB
APTT PPP: 27 SECONDS (ref 22–38)
BASOPHILS # BLD AUTO: 0 10E3/UL (ref 0–0.2)
BASOPHILS NFR BLD AUTO: 0 %
EOSINOPHIL # BLD AUTO: 0.1 10E3/UL (ref 0–0.7)
EOSINOPHIL NFR BLD AUTO: 2 %
ERYTHROCYTE [DISTWIDTH] IN BLOOD BY AUTOMATED COUNT: 11.8 % (ref 10–15)
HCT VFR BLD AUTO: 46.2 % (ref 40–53)
HGB BLD-MCNC: 15.3 G/DL (ref 13.3–17.7)
IMM GRANULOCYTES # BLD: 0 10E3/UL
IMM GRANULOCYTES NFR BLD: 0 %
INR PPP: 0.99 (ref 0.85–1.15)
LYMPHOCYTES # BLD AUTO: 1.9 10E3/UL (ref 0.8–5.3)
LYMPHOCYTES NFR BLD AUTO: 31 %
MCH RBC QN AUTO: 29.4 PG (ref 26.5–33)
MCHC RBC AUTO-ENTMCNC: 33.1 G/DL (ref 31.5–36.5)
MCV RBC AUTO: 89 FL (ref 78–100)
MONOCYTES # BLD AUTO: 0.5 10E3/UL (ref 0–1.3)
MONOCYTES NFR BLD AUTO: 8 %
NEUTROPHILS # BLD AUTO: 3.7 10E3/UL (ref 1.6–8.3)
NEUTROPHILS NFR BLD AUTO: 60 %
PLATELET # BLD AUTO: 208 10E3/UL (ref 150–450)
RBC # BLD AUTO: 5.2 10E6/UL (ref 4.4–5.9)
WBC # BLD AUTO: 6.2 10E3/UL (ref 4–11)

## 2024-09-05 PROCEDURE — 85730 THROMBOPLASTIN TIME PARTIAL: CPT | Performed by: PEDIATRICS

## 2024-09-05 PROCEDURE — 99214 OFFICE O/P EST MOD 30 MIN: CPT | Performed by: PEDIATRICS

## 2024-09-05 PROCEDURE — 36415 COLL VENOUS BLD VENIPUNCTURE: CPT | Performed by: PEDIATRICS

## 2024-09-05 PROCEDURE — 85610 PROTHROMBIN TIME: CPT | Performed by: PEDIATRICS

## 2024-09-05 PROCEDURE — 85025 COMPLETE CBC W/AUTO DIFF WBC: CPT | Performed by: PEDIATRICS

## 2024-09-05 RX ORDER — POLYETHYLENE GLYCOL 3350 17 G/17G
1 POWDER, FOR SOLUTION ORAL DAILY
Qty: 587 G | Refills: 1 | Status: SHIPPED | OUTPATIENT
Start: 2024-09-05

## 2024-09-05 NOTE — PROGRESS NOTES
Assessment & Plan     Chronic idiopathic constipation     - CBC with platelets and differential; Future  - INR  - PARTIAL THROMBOPLASTIN TIME  - polyethylene glycol (MIRALAX) 17 GM/Dose powder; Take 17 g (1 Capful) by mouth daily.  - CBC with platelets and differential    Hematochezia     - Adult GI  Referral - Consult Only; Future    SUBJECTIVE:    Brian Hooks is a 19 year old male  who presents with the chief complaint of rectal bleed after stooling.    he reports this problem first occuring   several; weeks ago  Hx of constipation   GI: negative for NEGATIVE, abdominal pain, constipation, diarrhea, dyspepsia, dysphagia, gas or bloating, heartburn or reflux, hematemesis, hematochezia, hemorrhoids, HX colon polyps, HX gallbladder trouble, HX gastritis, HX GERD, HX IBS, melena, nausea, poor appetite, vomiting and weight loss     OBJECTIVE:      GENERAL: Alert, vigorous, well nourished, well developed, no acute distress.  SKIN: skin is clear, no rash, abnormal pigmentation or lesions  HEAD: The head is normocephalic. The fontanels and sutures are normal  EYES: The eyes are normal. The conjunctivae and cornea normal. Light reflex is symmetric and no eye movement on cover/uncover test  EARS: The external auditory canals are clear and the tympanic membranes are normal; gray and translucent.  NOSE: Clear, no discharge or congestion  MOUTH/THROAT: The throat is clear, no oral lesions  NECK: The neck is supple and thyroid is normal, no masses  LYMPH NODES: No adenopathy  LUNGS: The lung fields are clear to auscultation,no rales, rhonchi, wheezing or retractions  HEART: The precordium is quiet. Rhythm is regular. S1 and S2 are normal. No murmurs.  ABDOMEN: The umbilicus is normal. The bowel sounds are normal. Abdomen soft, non tender,  non distended, no masses or hepatosplenomegaly.  NEUROLOGIC: Normal tone throughout. Has normal and symmetric reflexes for age  MS: Symmetric extremities no deformities. Spine  "is straight, no scoliosis. Normal muscle strength.  superficial rectal fisure  On exam    ASSESSMENT/PLAN:    Rectal fissure    Miralax stool softner , high fiver foods discussed. Fu in 2-3 weeks rec  Per encounter diagnoses and orders.        30   minutes spent on patient's problem evaluation and management  including time  devoted to previous noted and medicalhx associated with problem, coordination of care for diagnosis and plan , and documentation as  noted above   Discussion included  future prevention and treatment  options as well as side effects and dosing of medications related to    Chronic idiopathic constipation  Hematochezia         BMI  Estimated body mass index is 26.12 kg/m  as calculated from the following:    Height as of 2/5/24: 5' 11\" (1.803 m).    Weight as of this encounter: 187 lb 4.8 oz (85 kg).   Answers submitted by the patient for this visit:  General Questionnaire (Submitted on 9/4/2024)  Chief Complaint: Chronic problems general questions HPI Form  How many days per week do you miss taking your medication?: 4  What makes it hard for you to take your medication every day?: remembering to take  General Concern (Submitted on 9/4/2024)  Chief Complaint: Chronic problems general questions HPI Form  What is the reason for your visit today?: Blood in stool  When did your symptoms begin?: 3-4 weeks ago  How would you describe these symptoms?: Mild  Are your symptoms:: Staying the same  Have you had these symptoms before?: No    "

## 2024-09-05 NOTE — LETTER
September 6, 2024      [place]ri[place]iraj Hooks  [address] [address] [facility] [address]        Dear ,    We are writing to inform you of your test results.    Your lab results are normal.      Resulted Orders   INR   Result Value Ref Range    INR 0.99 0.85 - 1.15   PARTIAL THROMBOPLASTIN TIME   Result Value Ref Range    aPTT 27 22 - 38 Seconds   CBC with platelets and differential   Result Value Ref Range    WBC Count 6.2 4.0 - 11.0 10e3/uL    RBC Count 5.20 4.40 - 5.90 10e6/uL    Hemoglobin 15.3 13.3 - 17.7 g/dL    Hematocrit 46.2 40.0 - 53.0 %    MCV 89 78 - 100 fL    MCH 29.4 26.5 - 33.0 pg    MCHC 33.1 31.5 - 36.5 g/dL    RDW 11.8 10.0 - 15.0 %    Platelet Count 208 150 - 450 10e3/uL    % Neutrophils 60 %    % Lymphocytes 31 %    % Monocytes 8 %    % Eosinophils 2 %    % Basophils 0 %    % Immature Granulocytes 0 %    Absolute Neutrophils 3.7 1.6 - 8.3 10e3/uL    Absolute Lymphocytes 1.9 0.8 - 5.3 10e3/uL    Absolute Monocytes 0.5 0.0 - 1.3 10e3/uL    Absolute Eosinophils 0.1 0.0 - 0.7 10e3/uL    Absolute Basophils 0.0 0.0 - 0.2 10e3/uL    Absolute Immature Granulocytes 0.0 <=0.4 10e3/uL       If you have any questions or concerns, please call the clinic at the number listed above.       Sincerely,      Sonya Gee MD

## 2024-09-12 ENCOUNTER — MYC MEDICAL ADVICE (OUTPATIENT)
Dept: PEDIATRICS | Facility: CLINIC | Age: 19
End: 2024-09-12
Payer: COMMERCIAL

## 2024-09-12 DIAGNOSIS — K92.1 HEMATOCHEZIA: Primary | ICD-10-CM

## 2024-09-13 NOTE — TELEPHONE ENCOUNTER
"Dr. Gee,    Please see pt MyChart message and advise.   Suggest pt be seen again in office today?    Additionally, are you able to make pt's GI referral urgent so he can get in sooner than December?    Per 9/5 OV note:  \"Fu in 2-3 weeks rec\"    Thank you,  Shereen Todd RN    "

## 2024-09-16 NOTE — TELEPHONE ENCOUNTER
Please set up follow-up VV this week.  Recommend ER if increased bleeding or if associated with abdominal pain , fatigue or SOB    An urgent GI ref was made

## 2024-09-19 ENCOUNTER — VIRTUAL VISIT (OUTPATIENT)
Dept: PEDIATRICS | Facility: CLINIC | Age: 19
End: 2024-09-19
Payer: COMMERCIAL

## 2024-09-19 ENCOUNTER — MYC MEDICAL ADVICE (OUTPATIENT)
Dept: PEDIATRICS | Facility: CLINIC | Age: 19
End: 2024-09-19

## 2024-09-19 ENCOUNTER — TELEPHONE (OUTPATIENT)
Dept: SURGERY | Facility: CLINIC | Age: 19
End: 2024-09-19

## 2024-09-19 DIAGNOSIS — K59.00 CONSTIPATION, UNSPECIFIED CONSTIPATION TYPE: ICD-10-CM

## 2024-09-19 DIAGNOSIS — K92.1 HEMATOCHEZIA: Primary | ICD-10-CM

## 2024-09-19 PROCEDURE — 99443 PR PHYSICIAN TELEPHONE EVALUATION 21-30 MIN: CPT | Mod: 93 | Performed by: PEDIATRICS

## 2024-09-19 NOTE — PROGRESS NOTES
Wt Readings from Last 1 Encounters:   09/05/24 187 lb 4.8 oz (85 kg)    Brian Hooks is a 19 year old male who is being evaluated via a billable telephone visit.      What phone number would you like to be contacted at?  [phone]   How would you like to obtain your AVS? Mail a copy    Assessment & Plan   Diagnoses and all orders for this visit:    Hematochezia  -     Adult Colorectal Surgery  Referral; Future  Presumed anal  fissure   Constipation . . Rec clean out   Rec  colorectal surgery in the next few days    Other orders  -     REVIEW OF HEALTH MAINTENANCE PROTOCOL ORDERS        Prescription drug management           Follow Up  Needs follow-up 1 week with me    Needs to see rectal surg within 1 week    Needs to be seen in ER if increase in hematochezia   Or if associated with abdominal or rectal pain    next preventive care visit    Sonya Gee MD        Subjective   Brian Hooks is a 19 year old male  presents for the following health issues      [id number]:    Brian Hooks  is a  19 year old male who presents for followup of  CONSTIPATION and hematochezia .   .    he  was  recently  diagnosed and treated for  constipation associated blood in stool.    his presenting symptoms   have persisted   small stools despite taking 1 capful Miralax 1 capful per day,    Has not increased Miralax dose     OBJECTIVE:     Exam:  Physical Exam:   19 year old well developed, well nourished male in no apparent   distress.   Exam NA tel   Assessment:         Hematochezia  Constipation, unspecified constipation type      Plan:    clean out    As well as surg referral urgent in next few days      Follow up if   symptom duration   greater than two weeks or worsening symptoms.     HPI         per orders   Review of Systems   Constitutional, eye, ENT, skin, respiratory, cardiac, and GI are normal except as otherwise noted.      Objective           Vitals:  No vitals were obtained today due to  virtual visit.    Physical Exam   No exam completed due to telephone visit.    Diagnostics : None             Phone call duration: 24 minutes

## 2024-09-19 NOTE — PATIENT INSTRUCTIONS
Plan:  1. Clean Out at home over one day: 10 mg of bisacodyl ( 5 mg if under 88 lb) followed by a 7.5 capfuls of MiraLAX over several hours beginning in the morning while on a clear liquid diet  2. Miralax: 1 capful once a day + 7.5 mg of chewable Ex-Lax every evening  3. Scheduled toilet sits with foot support for 5-10 minutes each: 3 times per day after a meal or snack   4. Keep track of progress on chart or calendar     All instructions were given in writing and reviewed with mother in great detail with the .  Mother also reports that she is able to read English.

## 2024-09-19 NOTE — TELEPHONE ENCOUNTER
Other optiosn to call are   [facility] [place] [phone]    Also [facility]  [phone]

## 2024-09-19 NOTE — LETTER
Bridgette Torres,,'      Here is the clean out plan we discussed.      Thanks      Sonya Gee MD .         1. Clean Out at home over one day: 10 mg of bisacodyl ( 5 mg if under 88 lb) followed by a 7.5 capfuls of MiraLAX over several hours beginning in the morning while on a clear liquid diet  2. Miralax: 1 capful once a day + 7.5 mg of chewable Ex-Lax every evening  3. Scheduled toilet sits with foot support for 5-10 minutes each: 3 times per day after a meal or snack   4. Keep track of progress on chart or calendar     All instructions were given in writing and reviewed with mother in great detail with the .  Mother also reports that she is able to read English.

## 2024-09-19 NOTE — TELEPHONE ENCOUNTER
[facility] Call Center    Phone Message    May a detailed message be left on voicemail: yes     Reason for Call: The patient called to schedule referral for Hematochezia [K92.1]. Writer scheduled first available and added to wait list. Patient stated referring provider recommended to be see within 3-5 days. Please review and contact patient.    Action Taken: Message routed to:  [facility] Rectal    Travel Screening: Not Applicable

## 2024-09-20 NOTE — TELEPHONE ENCOUNTER
Diagnosis, Referred by & from: Hematochezia   Appt date: 12/3/2024   NOTES STATUS DETAILS   OFFICE NOTE from referring provider [facility]:  9/19/24, 9/5/24 - PCC OV with Dr. Gee   OFFICE NOTE from other specialist Internal [facility]:  2/5/24 - PCC OV with Dr. Rajan    [facility]:  1/11/24 - UC OV with Dr. Arrieta   DISCHARGE SUMMARY from [facility]:  7/20/23 - Admission with Dr. Mustafa   DISCHARGE REPORT from the ER [facility]:  1/29/24 - ED OV with Dr. Titus   OPERATIVE REPORT N/A    MEDICATION LIST Internal    LABS     ANAL PAP/CEA N/A    BIOPSIES/PATHOLOGY RELATED TO DIAGNOSIS N/A    DIAGNOSTIC PROCEDURES     PFC TESTING (from the Pelvic floor center includes Manometry, PDNL, EMG, etc.) N/A    COLONOSCOPY (most recent all time after 5 years) N/A    FLEX SIGMOIDOSCOPY N/A    UPPER ENDOSCOPY (EGD) Internal [facility]:  7/20/23 - EGD   ERCP N/A    IMAGING (DISC & REPORT)      CT Internal [facility]:  1/29/24 - CT Abd/Pelvis   MRI N/A    XRAY Internal [facility]:  1/11/24 - XR Abdomen   ULTRASOUND  (ENDOANAL/ENDORECTAL) N/A

## 2024-10-06 ENCOUNTER — HEALTH MAINTENANCE LETTER (OUTPATIENT)
Age: 19
End: 2024-10-06

## 2024-12-03 ENCOUNTER — PRE VISIT (OUTPATIENT)
Dept: SURGERY | Facility: CLINIC | Age: 19
End: 2024-12-03

## (undated) DEVICE — ENDO SNARE HEXAGONAL ISNARE 2.5X4.0CM W/25GA NDL

## (undated) DEVICE — KIT ENDO TURNOVER/PROCEDURE W/CLEAN A SCOPE LINERS 103888

## (undated) RX ORDER — FENTANYL CITRATE 50 UG/ML
INJECTION, SOLUTION INTRAMUSCULAR; INTRAVENOUS
Status: DISPENSED
Start: 2023-07-21